# Patient Record
Sex: MALE | Race: WHITE | NOT HISPANIC OR LATINO | Employment: OTHER | URBAN - NONMETROPOLITAN AREA
[De-identification: names, ages, dates, MRNs, and addresses within clinical notes are randomized per-mention and may not be internally consistent; named-entity substitution may affect disease eponyms.]

---

## 2021-02-26 ENCOUNTER — HOSPITAL ENCOUNTER (EMERGENCY)
Facility: HOSPITAL | Age: 76
Discharge: HOME | End: 2021-02-26
Attending: EMERGENCY MEDICINE | Admitting: EMERGENCY MEDICINE

## 2021-02-26 VITALS
TEMPERATURE: 98.3 F | SYSTOLIC BLOOD PRESSURE: 150 MMHG | RESPIRATION RATE: 16 BRPM | BODY MASS INDEX: 18.55 KG/M2 | HEART RATE: 54 BPM | OXYGEN SATURATION: 99 % | DIASTOLIC BLOOD PRESSURE: 90 MMHG | WEIGHT: 140 LBS | HEIGHT: 73 IN

## 2021-02-26 DIAGNOSIS — E11.44 DIABETIC AMYOTROPHY ASSOCIATED WITH TYPE 2 DIABETES MELLITUS (HCC): Primary | ICD-10-CM

## 2021-02-26 PROCEDURE — 99282 EMERGENCY DEPT VISIT SF MDM: CPT

## 2021-02-26 RX ORDER — GABAPENTIN 300 MG/1
300 CAPSULE ORAL 3 TIMES DAILY
Qty: 30 CAPSULE | Refills: 0 | Status: SHIPPED | OUTPATIENT
Start: 2021-02-26 | End: 2021-03-31 | Stop reason: SDUPTHER

## 2021-03-15 ENCOUNTER — TELEPHONE (OUTPATIENT)
Dept: PRIMARY CARE CLINIC | Age: 76
End: 2021-03-15

## 2021-03-15 ENCOUNTER — NURSE TRIAGE (OUTPATIENT)
Dept: OTHER | Facility: CLINIC | Age: 76
End: 2021-03-15

## 2021-03-15 ENCOUNTER — TELEPHONE (OUTPATIENT)
Dept: ADMINISTRATIVE | Age: 76
End: 2021-03-15

## 2021-03-15 NOTE — TELEPHONE ENCOUNTER
Cristian Patton returned call for patient from correctional facility and request to make appt for patient. Informed unable to schedule appt today for patients,ED is available for evaluation if needed.  Unable to talk with patient personally

## 2021-03-15 NOTE — TELEPHONE ENCOUNTER
C/o swelling and numbness in in legs, from the waist down. Reason for Disposition   MODERATE swelling of both ankles (e.g., swelling extends up to the knees) AND new onset or worsening    Answer Assessment - Initial Assessment Questions  1. ONSET: \"When did the swelling start? \" (e.g., minutes, hours, days)      Swelling for many years, swelling has gotten worse in the last 3 weeks. 2. LOCATION: \"What part of the leg is swollen? \"  \"Are both legs swollen or just one leg? \"      Both legs   . 3. SEVERITY: \"How bad is the swelling? \" (e.g., localized; mild, moderate, severe)   - Localized - small area of swelling localized to one leg   - MILD pedal edema - swelling limited to foot and ankle, pitting edema < 1/4 inch (6 mm) deep, rest and elevation eliminate most or all swelling   - MODERATE edema - swelling of lower leg to knee, pitting edema > 1/4 inch (6 mm) deep, rest and elevation only partially reduce swelling   - SEVERE edema - swelling extends above knee, facial or hand swelling present       Below knees, moderate     4. REDNESS: \"Does the swelling look red or infected?\"          5. PAIN: \"Is the swelling painful to touch? \" If so, ask: \"How painful is it? \"   (Scale 1-10; mild, moderate or severe)      8-10/10 burning sensation     6. FEVER: \"Do you have a fever? \" If so, ask: \"What is it, how was it measured, and when did it start? \"       No     7. CAUSE: \"What do you think is causing the leg swelling? \"      Neuropathy      8. MEDICAL HISTORY: \"Do you have a history of heart failure, kidney disease, liver failure, or cancer? \"     No     9. RECURRENT SYMPTOM: \"Have you had leg swelling before? \" If so, ask: \"When was the last time? \" \"What happened that time? \"      Yes, chronic issue. 10. OTHER SYMPTOMS: \"Do you have any other symptoms? \" (e.g., chest pain, difficulty breathing)        Numbness from knees down. 11. PREGNANCY: \"Is there any chance you are pregnant? \" \"When was your last menstrual period? \"        No    Protocols used: LEG SWELLING AND EDEMA-ADULT-OH    Patient called Yesika Rader at [facility])  with red flag complaint. Brief description of triage: bilateral leg swelling, pain and numbness, worse over the past couple of days. Unable to sleep. Triage indicates for patient to be seen today. Care advice provided, patient verbalizes understanding; denies any other questions or concerns; instructed to call back for any new or worsening symptoms. Writer provided warm transfer to Leon at [facility] for appointment scheduling. Attention Provider: Thank you for allowing me to participate in the care of your patient. The patient was connected to triage in response to information provided to the ECC. Please do not respond through this encounter as the response is not directed to a shared pool.

## 2021-03-31 ENCOUNTER — HOSPITAL ENCOUNTER (EMERGENCY)
Facility: HOSPITAL | Age: 76
Discharge: HOME | End: 2021-03-31
Attending: FAMILY MEDICINE | Admitting: FAMILY MEDICINE

## 2021-03-31 VITALS
HEIGHT: 73 IN | BODY MASS INDEX: 18.82 KG/M2 | HEART RATE: 85 BPM | DIASTOLIC BLOOD PRESSURE: 83 MMHG | SYSTOLIC BLOOD PRESSURE: 140 MMHG | RESPIRATION RATE: 18 BRPM | OXYGEN SATURATION: 93 % | WEIGHT: 142 LBS | TEMPERATURE: 98.1 F

## 2021-03-31 DIAGNOSIS — E11.44 DIABETIC AMYOTROPHY ASSOCIATED WITH TYPE 2 DIABETES MELLITUS (HCC): ICD-10-CM

## 2021-03-31 DIAGNOSIS — G62.9 PERIPHERAL POLYNEUROPATHY: Primary | ICD-10-CM

## 2021-03-31 PROCEDURE — 99282 EMERGENCY DEPT VISIT SF MDM: CPT

## 2021-03-31 RX ORDER — GABAPENTIN 300 MG/1
300 CAPSULE ORAL 3 TIMES DAILY
Qty: 30 CAPSULE | Refills: 0 | Status: SHIPPED | OUTPATIENT
Start: 2021-03-31 | End: 2021-03-31

## 2021-03-31 RX ORDER — GABAPENTIN 300 MG/1
300 CAPSULE ORAL 3 TIMES DAILY
Qty: 30 CAPSULE | Refills: 0 | Status: SHIPPED | OUTPATIENT
Start: 2021-03-31 | End: 2021-05-04 | Stop reason: SDUPTHER

## 2021-04-19 ENCOUNTER — APPOINTMENT (OUTPATIENT)
Dept: CT IMAGING | Facility: HOSPITAL | Age: 76
End: 2021-04-19

## 2021-04-19 ENCOUNTER — HOSPITAL ENCOUNTER (EMERGENCY)
Facility: HOSPITAL | Age: 76
Discharge: HOME | End: 2021-04-19
Admitting: EMERGENCY MEDICINE

## 2021-04-19 VITALS
HEIGHT: 73 IN | TEMPERATURE: 98.2 F | SYSTOLIC BLOOD PRESSURE: 110 MMHG | WEIGHT: 145 LBS | OXYGEN SATURATION: 99 % | HEART RATE: 75 BPM | RESPIRATION RATE: 16 BRPM | DIASTOLIC BLOOD PRESSURE: 70 MMHG | BODY MASS INDEX: 19.22 KG/M2

## 2021-04-19 DIAGNOSIS — M54.50 CHRONIC MIDLINE LOW BACK PAIN WITHOUT SCIATICA: ICD-10-CM

## 2021-04-19 DIAGNOSIS — G62.9 NEUROPATHY: ICD-10-CM

## 2021-04-19 DIAGNOSIS — G89.29 CHRONIC MIDLINE LOW BACK PAIN WITHOUT SCIATICA: ICD-10-CM

## 2021-04-19 DIAGNOSIS — Z76.0 MEDICATION REFILL: Primary | ICD-10-CM

## 2021-04-19 PROCEDURE — 99283 EMERGENCY DEPT VISIT LOW MDM: CPT

## 2021-04-19 RX ORDER — PANTOPRAZOLE SODIUM 40 MG/1
40 TABLET, DELAYED RELEASE ORAL DAILY
COMMUNITY
End: 2021-04-19 | Stop reason: SDUPTHER

## 2021-04-19 RX ORDER — GABAPENTIN 300 MG/1
600 CAPSULE ORAL ONCE
Status: COMPLETED | OUTPATIENT
Start: 2021-04-19 | End: 2021-04-19

## 2021-04-19 RX ORDER — PANTOPRAZOLE SODIUM 40 MG/1
40 TABLET, DELAYED RELEASE ORAL ONCE
Status: COMPLETED | OUTPATIENT
Start: 2021-04-19 | End: 2021-04-19

## 2021-04-19 RX ORDER — PANTOPRAZOLE SODIUM 40 MG/1
40 TABLET, DELAYED RELEASE ORAL DAILY
Qty: 30 TABLET | Refills: 0 | Status: SHIPPED | OUTPATIENT
Start: 2021-04-19 | End: 2021-05-19

## 2021-04-19 RX ADMIN — GABAPENTIN 600 MG: 300 CAPSULE ORAL at 16:41

## 2021-04-19 RX ADMIN — PANTOPRAZOLE SODIUM 40 MG: 40 TABLET, DELAYED RELEASE ORAL at 16:41

## 2021-04-19 RX ADMIN — METOPROLOL TARTRATE 12.5 MG: 25 TABLET, FILM COATED ORAL at 16:41

## 2021-04-19 NOTE — DISCHARGE INSTRUCTIONS
It is important that you have a primary care provider while you are in the area. Please see the list below for available provider.   Please also see the information below for pain management.   We cannot refill medications such as Neurontin (gabapentin) in the ED. It is important that you establish care with a primary doctor or pain management to continue these medications while in the area and once you go back home.     Follow up with one of the [facility] physician groups below to setup primary care. If you have trouble making an appointment, please call the [facility] Nurse Line at [phone]    Dr. Mireille Gonzalez DO, Dr. Candelario Alexander DO, and Nora Ziegler, ALEIDA  [facility] Primary Care  [address] [place] [address], [address] [phone]    Dr. Dusty Cope MD  [facility] Internal Medicine - [place] [address], [address], [place] [address] [phone]    Dr. Troy Mast DO, Dr. Jabari Yao DO,  Ginger Schmid, ALEIDA, and ALEIDA Mackenzie  [facility] Family & Internal Medicine - [address] [address], [address], [place] [address] [phone]     Dr. Jennifer Patino MD, and ALEIDA Barnhart  [facility] Family Medicine [address], [place], [place] [address] [phone]    Dr. Erickson Currie MD and Dr. Teddy Love MD  [facility] Family [facility]  [address], [address]  [phone]    Dr. Cole Patricio MD  [facility] Family [facility]  [address] [place] [address], [facility], [place], [place], [address] [phone]    Dr. Ion Olson MD  [facility] Family Medicine - [place]  [address] [place], [place], [address] [phone]

## 2021-04-19 NOTE — ED PROVIDER NOTES
"Subjective   History of Present Illness    Patient is a 76-year-old male with PMH significant for chronic back pain, arthritis, diabetic neuropathy presenting to ED with need for medication refill.  Patient reports he was in a [facility] for approximately 34 years and was released 2 months ago at which time he began living in a penitentiary house.  Patient reports that prior to going into the [facility] he had been on Percocets and gabapentin's and while in the [facility] they had him on gabapentin for his neuropathy, metoprolol, as well as Protonix.  Patient reports that a nurse practitioner at the [facility] was able to give him a short supply when he was initially released however since that time he has not been able to establish care with a primary care provider.  Patient reports he is called a few however they have not had openings in their clinics.  Patient is on a waiting list to hopefully be seen next week.  Patient reports that he ran out of all of his medications, including his gabapentin, 6 days ago.  Patient reports since that time he has had worsening of his normal chronic neuropathy and chronic lower back pain.  Patient reports that normally his neuropathy will begin symmetrical at the level of the knees and extend downward, worsening as it is distal.  Patient describes that he has significant \"Curves and kinks\" in his spine which were worsened sleeping on only one mat in the [facility].  [facility] but reports he is still doing excessive walking, including walking 25 blocks the ER today.  Patient adamantly denies any changes in his pain and reports that had he been on his medications he feels it would have been controlled.  Patient states \"I will never be pain-free again.\"  Patient denies any new injuries, radiation of the pain into his thoracic or cervical spine.  Patient adamantly denies fevers, saddle anesthesia, incontinence of bowel or bladder, " urinary retention, strength changes to his bilateral lower extremities.  Patient denies any use of IV drugs.  Patient reports that he is hoping to get into a pain clinic and requests a referral if possible.  Patient did note that he only plans to be in this area for hopefully 1 more month and then will be returning home where he is also looking for a primary care provider.    Records reviewed show patient was seen in the ED on 2/26/2021 for diabetic ametropia associated with type 2 diabetes mellitus.  At that time patient was advised that he cannot routinely follow-up through the ED for medications and was offered a one-time refill of gabapentin 300 mg to be taken 3 times a day quantity #30.  Patient was also seen in the ED on 3/31/2021 at which time he was diagnosed with peripheral polyneuropathy and given a prescription for gabapentin 300 mg quantity #30.    Review of Systems   Constitutional: Negative.  Negative for fever.   HENT: Negative.    Eyes: Negative.    Respiratory: Negative.    Cardiovascular: Negative.    Gastrointestinal: Negative.    Genitourinary: Negative.         Denies urinary retention   Musculoskeletal: Positive for back pain (mid to lower, no change in chronic). Negative for arthralgias and gait problem (pain worsened after prolonged ambulation but no difficulty with gait).   Skin: Negative.  Negative for wound.   Neurological: Positive for numbness (bilateral LE to knees, at baseline for neuropathy).        Denies incontinence of bowel or bladder  Denies saddle anesthesia   Psychiatric/Behavioral: Negative.    All other systems reviewed and are negative.      Past Medical History:   Diagnosis Date   • Acid reflux    • Arthritis    • Hydrocele     SCHEDULED FOR SURGERY   • Intermittent palpitations    • Joint pain     MULTIPLE JOINTS       No Known Allergies    Past Surgical History:   Procedure Laterality Date   • HERNIA REPAIR      BILATERAL   • HYDROCELECTOMY Right 6/1/2016    Procedure:  HYDROCELECTOMY;  Surgeon: Jorge Browning MD;  Location: [facility];  Service:        History reviewed. No pertinent family history.    Social History     Socioeconomic History   • Marital status: Single     Spouse name: Not on file   • Number of children: Not on file   • Years of education: Not on file   • Highest education level: Not on file   Tobacco Use   • Smoking status: Former Smoker     Packs/day: 1.00     Years: 50.00     Pack years: 50.00   • Smokeless tobacco: Never Used   • Tobacco comment: quit 2004   Substance and Sexual Activity   • Alcohol use: No   • Drug use: No   • Sexual activity: Defer           Objective   Physical Exam  Vitals and nursing note reviewed.   Constitutional:       General: He is not in acute distress.     Appearance: Normal appearance. He is well-developed, well-groomed and normal weight. He is not diaphoretic.   HENT:      Head: Normocephalic and atraumatic.      Mouth/Throat:      Mouth: Mucous membranes are moist.      Pharynx: Oropharynx is clear.   Eyes:      Conjunctiva/sclera: Conjunctivae normal.      Pupils: Pupils are equal, round, and reactive to light.   Cardiovascular:      Rate and Rhythm: Normal rate and regular rhythm.      Comments: No calf tenderness bilaterally  Pulmonary:      Effort: Pulmonary effort is normal. No respiratory distress.      Breath sounds: Normal breath sounds.   Abdominal:      General: Bowel sounds are normal.      Palpations: Abdomen is soft.      Tenderness: There is no right CVA tenderness or left CVA tenderness.   Musculoskeletal:      Cervical back: Normal and normal range of motion.      Thoracic back: No spasms or tenderness. Scoliosis present.      Lumbar back: Spasms (paraspinal muscular tightness) and tenderness (diffusely to lower aspect) present. Negative right straight leg raise test and negative left straight leg raise test. Scoliosis present.      Comments: 5/5 symmetric strength to bilateral LE     Skin:     General: Skin  is warm and dry.      Findings: No signs of injury, rash or wound.   Neurological:      General: No focal deficit present.      Mental Status: He is alert and oriented to person, place, and time.      Sensory: Sensory deficit (Decreased sensation to light touch symmetrically bilateral lower extremities beginning at the level of the knee, patient reports at baseline for his neuropathy) present.      Motor: Motor function is intact.      Coordination: Coordination is intact.      Gait: Gait normal.   Psychiatric:         Attention and Perception: Attention normal.         Mood and Affect: Mood normal.         Speech: Speech normal.         Behavior: Behavior normal. Behavior is cooperative.         Procedures           ED Course  ED Course as of Apr 19 1701 Mon Apr 19, 2021   1615 [facility] report #[id number].  Patient has two previous prescriptions filled in the ED on 02/26/21 and 03/31/21 for quantity number 30 of 300 mg gabapentin.     [JS]      ED Course User Index  [JS] Ed Pickett PA-C                                           MDM  Number of Diagnoses or Management Options     Amount and/or Complexity of Data Reviewed  Tests in the medicine section of CPT®: ordered and reviewed  Decide to obtain previous medical records or to obtain history from someone other than the patient: yes  Review and summarize past medical records: yes  Discuss the patient with other providers: yes (Dr. Gupta (attending))    Patient Progress  Patient progress: improved    Patient is a 76-year-old male with PMH significant for chronic back pain, arthritis, diabetic neuropathy presenting to ED with need for medication refill after being out of his gabapentin, metoprolol, Protonix for 5 days. Patient denies any changes to his chronic state and reports being out of his medications for five days has worsened his normal symptoms. Patient has not yet been able to establish care with a primary care provider. Advised patient that he can be  given a dose of medications in ED, including gabapentin.  Discussed with patient that through the ER we cannot routinely refill scheduled medications.  Discussed that he has already received a refill of the gabapentin at this time we can no longer continue to provide refills of this medication.  Gave patient a list of local primary care providers as well as information on local pain management clinics.  Discussed significant importance of contacting and establishing care with them, despite how long he will be in [place], so that he can have these medicines return to normal.  Discussed with patient ability to refill his cardiac and GI medications however he cannot continue to the return to the ED for these.  Explained at length to patient limitations to medication refills through ED.  Patient is frustrated but understanding.  Patient appreciated further dose of medication in the ED reporting he feels slightly better.  Patient is able to ambulate without difficulty.  Patient with no further questions, concerns, or needs and will be stable for discharge.  This case was discussed with Dr. Jorje Gupta who was in agreement with refilling chronic cardiac and GI medications however inability to refill medications and no further recommendations at this time.    Final diagnoses:   Medication refill   Neuropathy   Chronic midline low back pain without sciatica       ED Disposition  ED Disposition     ED Disposition Condition Comment    Discharge Stable           Provider, No Known  [facility] [address]  [phone]    Schedule an appointment as soon as possible for a visit in 2 days      [facility] - [address] [address] [phone]  Schedule an appointment as soon as possible for a visit  As needed    [facility] Emergency Department  [address] [place] [address] [phone]    As needed         Medication List       Changed    metoprolol tartrate 25 MG tablet  Commonly known as: LOPRESSOR  Take 0.5 tablet by mouth 2 (Two) Times a Day  What changed:   · how much to take  · when to take this  · additional instructions           Where to Get Your Medications      These medications were sent to [facility] [place]  [address] [address]    Hours: 7AM-5PM Mon-Fri Phone: [phone]   · metoprolol tartrate 25 MG tablet  · pantoprazole 40 MG EC tablet          Ed Pickett PA-C  04/19/21 1819

## 2021-05-04 ENCOUNTER — HOSPITAL ENCOUNTER (EMERGENCY)
Facility: HOSPITAL | Age: 76
Discharge: HOME | End: 2021-05-04
Attending: FAMILY MEDICINE | Admitting: FAMILY MEDICINE

## 2021-05-04 VITALS
RESPIRATION RATE: 20 BRPM | DIASTOLIC BLOOD PRESSURE: 79 MMHG | OXYGEN SATURATION: 99 % | HEART RATE: 81 BPM | BODY MASS INDEX: 19.09 KG/M2 | SYSTOLIC BLOOD PRESSURE: 95 MMHG | TEMPERATURE: 98.4 F | WEIGHT: 144 LBS | HEIGHT: 73 IN

## 2021-05-04 DIAGNOSIS — G62.9 PERIPHERAL POLYNEUROPATHY: Primary | ICD-10-CM

## 2021-05-04 PROCEDURE — 99282 EMERGENCY DEPT VISIT SF MDM: CPT

## 2021-05-04 RX ORDER — GABAPENTIN 300 MG/1
300 CAPSULE ORAL 3 TIMES DAILY
Qty: 51 CAPSULE | Refills: 0 | Status: SHIPPED | OUTPATIENT
Start: 2021-05-04

## 2021-05-07 ENCOUNTER — APPOINTMENT (OUTPATIENT)
Dept: GENERAL RADIOLOGY | Facility: HOSPITAL | Age: 76
End: 2021-05-07

## 2021-05-07 ENCOUNTER — APPOINTMENT (OUTPATIENT)
Dept: CT IMAGING | Facility: HOSPITAL | Age: 76
End: 2021-05-07

## 2021-05-07 ENCOUNTER — HOSPITAL ENCOUNTER (INPATIENT)
Facility: HOSPITAL | Age: 76
LOS: 1 days | Discharge: HOME | End: 2021-05-08
Attending: SPECIALIST | Admitting: SPECIALIST

## 2021-05-07 DIAGNOSIS — R11.2 NAUSEA AND VOMITING, INTRACTABILITY OF VOMITING NOT SPECIFIED, UNSPECIFIED VOMITING TYPE: ICD-10-CM

## 2021-05-07 DIAGNOSIS — K56.609 SBO (SMALL BOWEL OBSTRUCTION) (HCC): Primary | ICD-10-CM

## 2021-05-07 LAB
ADV 40+41 DNA STL QL NAA+NON-PROBE: NOT DETECTED
ALBUMIN SERPL-MCNC: 3.7 G/DL (ref 3.5–5.2)
ALBUMIN/GLOB SERPL: 1.1 G/DL
ALP SERPL-CCNC: 84 U/L (ref 39–117)
ALT SERPL W P-5'-P-CCNC: 10 U/L (ref 1–41)
ANION GAP SERPL CALCULATED.3IONS-SCNC: 10 MMOL/L (ref 5–15)
AST SERPL-CCNC: 19 U/L (ref 1–40)
ASTRO TYP 1-8 RNA STL QL NAA+NON-PROBE: NOT DETECTED
BACTERIA UR QL AUTO: ABNORMAL /HPF
BASOPHILS # BLD AUTO: 0.01 10*3/MM3 (ref 0–0.2)
BASOPHILS NFR BLD AUTO: 0.1 % (ref 0–1.5)
BILIRUB SERPL-MCNC: 0.3 MG/DL (ref 0–1.2)
BILIRUB UR QL STRIP: NEGATIVE
BUN SERPL-MCNC: 30 MG/DL (ref 8–23)
BUN/CREAT SERPL: 31.9 (ref 7–25)
C CAYETANENSIS DNA STL QL NAA+NON-PROBE: NOT DETECTED
C COLI+JEJ+UPSA DNA STL QL NAA+NON-PROBE: NOT DETECTED
CALCIUM SPEC-SCNC: 8.4 MG/DL (ref 8.6–10.5)
CHLORIDE SERPL-SCNC: 108 MMOL/L (ref 98–107)
CLARITY UR: CLEAR
CO2 SERPL-SCNC: 19 MMOL/L (ref 22–29)
COLOR UR: ABNORMAL
CREAT SERPL-MCNC: 0.94 MG/DL (ref 0.76–1.27)
CRYPTOSP DNA STL QL NAA+NON-PROBE: NOT DETECTED
D-LACTATE SERPL-SCNC: 1.3 MMOL/L (ref 0.5–2)
DEPRECATED RDW RBC AUTO: 50.1 FL (ref 37–54)
E HISTOLYT DNA STL QL NAA+NON-PROBE: NOT DETECTED
EAEC PAA PLAS AGGR+AATA ST NAA+NON-PRB: NOT DETECTED
EC STX1+STX2 GENES STL QL NAA+NON-PROBE: NOT DETECTED
EOSINOPHIL # BLD AUTO: 0.04 10*3/MM3 (ref 0–0.4)
EOSINOPHIL NFR BLD AUTO: 0.5 % (ref 0.3–6.2)
EPEC EAE GENE STL QL NAA+NON-PROBE: NOT DETECTED
ERYTHROCYTE [DISTWIDTH] IN BLOOD BY AUTOMATED COUNT: 15.4 % (ref 12.3–15.4)
ETEC LTA+ST1A+ST1B TOX ST NAA+NON-PROBE: NOT DETECTED
G LAMBLIA DNA STL QL NAA+NON-PROBE: NOT DETECTED
GFR SERPL CREATININE-BSD FRML MDRD: 78 ML/MIN/1.73
GLOBULIN UR ELPH-MCNC: 3.3 GM/DL
GLUCOSE SERPL-MCNC: 110 MG/DL (ref 65–99)
GLUCOSE UR STRIP-MCNC: NEGATIVE MG/DL
HCT VFR BLD AUTO: 37.2 % (ref 37.5–51)
HGB BLD-MCNC: 12.1 G/DL (ref 13–17.7)
HGB UR QL STRIP.AUTO: NEGATIVE
HYALINE CASTS UR QL AUTO: ABNORMAL /LPF
IMM GRANULOCYTES # BLD AUTO: 0.04 10*3/MM3 (ref 0–0.05)
IMM GRANULOCYTES NFR BLD AUTO: 0.5 % (ref 0–0.5)
INR PPP: 1.29 (ref 0.91–1.09)
KETONES UR QL STRIP: NEGATIVE
LEUKOCYTE ESTERASE UR QL STRIP.AUTO: NEGATIVE
LYMPHOCYTES # BLD AUTO: 0.48 10*3/MM3 (ref 0.7–3.1)
LYMPHOCYTES NFR BLD AUTO: 6.3 % (ref 19.6–45.3)
MCH RBC QN AUTO: 28.8 PG (ref 26.6–33)
MCHC RBC AUTO-ENTMCNC: 32.5 G/DL (ref 31.5–35.7)
MCV RBC AUTO: 88.6 FL (ref 79–97)
MONOCYTES # BLD AUTO: 0.54 10*3/MM3 (ref 0.1–0.9)
MONOCYTES NFR BLD AUTO: 7.1 % (ref 5–12)
NEUTROPHILS NFR BLD AUTO: 6.53 10*3/MM3 (ref 1.7–7)
NEUTROPHILS NFR BLD AUTO: 85.5 % (ref 42.7–76)
NITRITE UR QL STRIP: NEGATIVE
NOROVIRUS GI+II RNA STL QL NAA+NON-PROBE: DETECTED
NRBC BLD AUTO-RTO: 0 /100 WBC (ref 0–0.2)
P SHIGELLOIDES DNA STL QL NAA+NON-PROBE: NOT DETECTED
PH UR STRIP.AUTO: <=5 [PH] (ref 5–8)
PLATELET # BLD AUTO: 180 10*3/MM3 (ref 140–450)
PMV BLD AUTO: 10.3 FL (ref 6–12)
POTASSIUM SERPL-SCNC: 3.6 MMOL/L (ref 3.5–5.2)
PROT SERPL-MCNC: 7 G/DL (ref 6–8.5)
PROT UR QL STRIP: ABNORMAL
PROTHROMBIN TIME: 15.1 SECONDS (ref 11.5–13.4)
RBC # BLD AUTO: 4.2 10*6/MM3 (ref 4.14–5.8)
RBC # UR: ABNORMAL /HPF
REF LAB TEST METHOD: ABNORMAL
RVA RNA STL QL NAA+NON-PROBE: NOT DETECTED
S ENT+BONG DNA STL QL NAA+NON-PROBE: NOT DETECTED
SAPO I+II+IV+V RNA STL QL NAA+NON-PROBE: NOT DETECTED
SARS-COV-2 RNA PNL SPEC NAA+PROBE: NOT DETECTED
SHIGELLA SP+EIEC IPAH ST NAA+NON-PROBE: NOT DETECTED
SODIUM SERPL-SCNC: 137 MMOL/L (ref 136–145)
SP GR UR STRIP: >1.03 (ref 1–1.03)
SQUAMOUS #/AREA URNS HPF: ABNORMAL /HPF
UROBILINOGEN UR QL STRIP: ABNORMAL
V CHOL+PARA+VUL DNA STL QL NAA+NON-PROBE: NOT DETECTED
V CHOLERAE DNA STL QL NAA+NON-PROBE: NOT DETECTED
WBC # BLD AUTO: 7.64 10*3/MM3 (ref 3.4–10.8)
WBC UR QL AUTO: ABNORMAL /HPF
Y ENTEROCOL DNA STL QL NAA+NON-PROBE: NOT DETECTED

## 2021-05-07 PROCEDURE — 25010000002 ONDANSETRON PER 1 MG: Performed by: NURSE PRACTITIONER

## 2021-05-07 PROCEDURE — 81001 URINALYSIS AUTO W/SCOPE: CPT | Performed by: NURSE PRACTITIONER

## 2021-05-07 PROCEDURE — 83605 ASSAY OF LACTIC ACID: CPT | Performed by: NURSE PRACTITIONER

## 2021-05-07 PROCEDURE — 85610 PROTHROMBIN TIME: CPT | Performed by: NURSE PRACTITIONER

## 2021-05-07 PROCEDURE — 25010000002 KETOROLAC TROMETHAMINE PER 15 MG: Performed by: SPECIALIST

## 2021-05-07 PROCEDURE — 80053 COMPREHEN METABOLIC PANEL: CPT | Performed by: NURSE PRACTITIONER

## 2021-05-07 PROCEDURE — 0097U HC BIOFIRE FILMARRAY GI PANEL: CPT | Performed by: NURSE PRACTITIONER

## 2021-05-07 PROCEDURE — 87635 SARS-COV-2 COVID-19 AMP PRB: CPT | Performed by: NURSE PRACTITIONER

## 2021-05-07 PROCEDURE — 99285 EMERGENCY DEPT VISIT HI MDM: CPT

## 2021-05-07 PROCEDURE — 0D9670Z DRAINAGE OF STOMACH WITH DRAINAGE DEVICE, VIA NATURAL OR ARTIFICIAL OPENING: ICD-10-PCS | Performed by: SPECIALIST

## 2021-05-07 PROCEDURE — 87040 BLOOD CULTURE FOR BACTERIA: CPT | Performed by: NURSE PRACTITIONER

## 2021-05-07 PROCEDURE — 71045 X-RAY EXAM CHEST 1 VIEW: CPT

## 2021-05-07 PROCEDURE — 25010000002 MORPHINE PER 10 MG: Performed by: SPECIALIST

## 2021-05-07 PROCEDURE — 74177 CT ABD & PELVIS W/CONTRAST: CPT

## 2021-05-07 PROCEDURE — 25010000002 IOPAMIDOL 61 % SOLUTION: Performed by: NURSE PRACTITIONER

## 2021-05-07 PROCEDURE — 85025 COMPLETE CBC W/AUTO DIFF WBC: CPT | Performed by: NURSE PRACTITIONER

## 2021-05-07 RX ORDER — ONDANSETRON 2 MG/ML
4 INJECTION INTRAMUSCULAR; INTRAVENOUS ONCE
Status: COMPLETED | OUTPATIENT
Start: 2021-05-07 | End: 2021-05-07

## 2021-05-07 RX ORDER — MORPHINE SULFATE 2 MG/ML
2 INJECTION, SOLUTION INTRAMUSCULAR; INTRAVENOUS
Status: DISCONTINUED | OUTPATIENT
Start: 2021-05-07 | End: 2021-05-08 | Stop reason: HOSPADM

## 2021-05-07 RX ORDER — METOPROLOL TARTRATE 5 MG/5ML
2.5 INJECTION INTRAVENOUS EVERY 6 HOURS
Status: DISCONTINUED | OUTPATIENT
Start: 2021-05-07 | End: 2021-05-08 | Stop reason: HOSPADM

## 2021-05-07 RX ORDER — ONDANSETRON 2 MG/ML
4 INJECTION INTRAMUSCULAR; INTRAVENOUS EVERY 6 HOURS PRN
Status: DISCONTINUED | OUTPATIENT
Start: 2021-05-07 | End: 2021-05-08 | Stop reason: HOSPADM

## 2021-05-07 RX ORDER — SODIUM CHLORIDE 0.9 % (FLUSH) 0.9 %
10 SYRINGE (ML) INJECTION EVERY 12 HOURS SCHEDULED
Status: DISCONTINUED | OUTPATIENT
Start: 2021-05-07 | End: 2021-05-08 | Stop reason: HOSPADM

## 2021-05-07 RX ORDER — LIDOCAINE HYDROCHLORIDE 40 MG/ML
4 SOLUTION TOPICAL ONCE
Status: COMPLETED | OUTPATIENT
Start: 2021-05-07 | End: 2021-05-07

## 2021-05-07 RX ORDER — FAMOTIDINE 10 MG/ML
20 INJECTION, SOLUTION INTRAVENOUS 2 TIMES DAILY
Status: DISCONTINUED | OUTPATIENT
Start: 2021-05-07 | End: 2021-05-08 | Stop reason: HOSPADM

## 2021-05-07 RX ORDER — SODIUM CHLORIDE 0.9 % (FLUSH) 0.9 %
10 SYRINGE (ML) INJECTION AS NEEDED
Status: DISCONTINUED | OUTPATIENT
Start: 2021-05-07 | End: 2021-05-08 | Stop reason: HOSPADM

## 2021-05-07 RX ORDER — KETOROLAC TROMETHAMINE 30 MG/ML
15 INJECTION, SOLUTION INTRAMUSCULAR; INTRAVENOUS EVERY 6 HOURS PRN
Status: DISCONTINUED | OUTPATIENT
Start: 2021-05-07 | End: 2021-05-08 | Stop reason: HOSPADM

## 2021-05-07 RX ORDER — DEXTROSE, SODIUM CHLORIDE, AND POTASSIUM CHLORIDE 5; .45; .15 G/100ML; G/100ML; G/100ML
125 INJECTION INTRAVENOUS CONTINUOUS
Status: DISCONTINUED | OUTPATIENT
Start: 2021-05-07 | End: 2021-05-08 | Stop reason: HOSPADM

## 2021-05-07 RX ADMIN — SODIUM CHLORIDE 1000 ML: 9 INJECTION, SOLUTION INTRAVENOUS at 12:28

## 2021-05-07 RX ADMIN — MORPHINE SULFATE 4 MG: 4 INJECTION, SOLUTION INTRAMUSCULAR; INTRAVENOUS at 17:14

## 2021-05-07 RX ADMIN — POTASSIUM CHLORIDE, DEXTROSE MONOHYDRATE AND SODIUM CHLORIDE 125 ML/HR: 150; 5; 450 INJECTION, SOLUTION INTRAVENOUS at 18:07

## 2021-05-07 RX ADMIN — IOPAMIDOL 100 ML: 612 INJECTION, SOLUTION INTRAVENOUS at 13:33

## 2021-05-07 RX ADMIN — ONDANSETRON HYDROCHLORIDE 4 MG: 2 SOLUTION INTRAMUSCULAR; INTRAVENOUS at 12:28

## 2021-05-07 RX ADMIN — METOPROLOL TARTRATE 2.5 MG: 5 INJECTION INTRAVENOUS at 17:14

## 2021-05-07 RX ADMIN — KETOROLAC TROMETHAMINE 15 MG: 30 INJECTION, SOLUTION INTRAMUSCULAR; INTRAVENOUS at 20:52

## 2021-05-07 RX ADMIN — FAMOTIDINE 20 MG: 10 INJECTION INTRAVENOUS at 20:52

## 2021-05-07 RX ADMIN — LIDOCAINE HYDROCHLORIDE 4 ML: 40 SOLUTION TOPICAL at 15:30

## 2021-05-07 RX ADMIN — SODIUM CHLORIDE 500 ML: 9 INJECTION, SOLUTION INTRAVENOUS at 20:52

## 2021-05-08 ENCOUNTER — APPOINTMENT (OUTPATIENT)
Dept: GENERAL RADIOLOGY | Facility: HOSPITAL | Age: 76
End: 2021-05-08

## 2021-05-08 VITALS
OXYGEN SATURATION: 95 % | DIASTOLIC BLOOD PRESSURE: 69 MMHG | HEART RATE: 74 BPM | SYSTOLIC BLOOD PRESSURE: 133 MMHG | BODY MASS INDEX: 18.66 KG/M2 | HEIGHT: 73 IN | WEIGHT: 140.8 LBS | RESPIRATION RATE: 16 BRPM | TEMPERATURE: 97.5 F

## 2021-05-08 LAB
ANION GAP SERPL CALCULATED.3IONS-SCNC: 7 MMOL/L (ref 5–15)
BASOPHILS # BLD AUTO: 0.01 10*3/MM3 (ref 0–0.2)
BASOPHILS NFR BLD AUTO: 0.1 % (ref 0–1.5)
BUN SERPL-MCNC: 17 MG/DL (ref 8–23)
BUN/CREAT SERPL: 23.6 (ref 7–25)
CALCIUM SPEC-SCNC: 7.9 MG/DL (ref 8.6–10.5)
CHLORIDE SERPL-SCNC: 113 MMOL/L (ref 98–107)
CO2 SERPL-SCNC: 19 MMOL/L (ref 22–29)
CREAT SERPL-MCNC: 0.72 MG/DL (ref 0.76–1.27)
DEPRECATED RDW RBC AUTO: 50 FL (ref 37–54)
EOSINOPHIL # BLD AUTO: 0.03 10*3/MM3 (ref 0–0.4)
EOSINOPHIL NFR BLD AUTO: 0.4 % (ref 0.3–6.2)
ERYTHROCYTE [DISTWIDTH] IN BLOOD BY AUTOMATED COUNT: 15.5 % (ref 12.3–15.4)
GFR SERPL CREATININE-BSD FRML MDRD: 106 ML/MIN/1.73
GLUCOSE SERPL-MCNC: 108 MG/DL (ref 65–99)
HCT VFR BLD AUTO: 33.2 % (ref 37.5–51)
HGB BLD-MCNC: 10.6 G/DL (ref 13–17.7)
IMM GRANULOCYTES # BLD AUTO: 0.02 10*3/MM3 (ref 0–0.05)
IMM GRANULOCYTES NFR BLD AUTO: 0.3 % (ref 0–0.5)
LYMPHOCYTES # BLD AUTO: 0.54 10*3/MM3 (ref 0.7–3.1)
LYMPHOCYTES NFR BLD AUTO: 7.7 % (ref 19.6–45.3)
MCH RBC QN AUTO: 28.2 PG (ref 26.6–33)
MCHC RBC AUTO-ENTMCNC: 31.9 G/DL (ref 31.5–35.7)
MCV RBC AUTO: 88.3 FL (ref 79–97)
MONOCYTES # BLD AUTO: 0.55 10*3/MM3 (ref 0.1–0.9)
MONOCYTES NFR BLD AUTO: 7.8 % (ref 5–12)
NEUTROPHILS NFR BLD AUTO: 5.88 10*3/MM3 (ref 1.7–7)
NEUTROPHILS NFR BLD AUTO: 83.7 % (ref 42.7–76)
NRBC BLD AUTO-RTO: 0 /100 WBC (ref 0–0.2)
PLATELET # BLD AUTO: 141 10*3/MM3 (ref 140–450)
PMV BLD AUTO: 10.5 FL (ref 6–12)
POTASSIUM SERPL-SCNC: 3.5 MMOL/L (ref 3.5–5.2)
RBC # BLD AUTO: 3.76 10*6/MM3 (ref 4.14–5.8)
SODIUM SERPL-SCNC: 139 MMOL/L (ref 136–145)
WBC # BLD AUTO: 7.03 10*3/MM3 (ref 3.4–10.8)

## 2021-05-08 PROCEDURE — 85025 COMPLETE CBC W/AUTO DIFF WBC: CPT | Performed by: SPECIALIST

## 2021-05-08 PROCEDURE — 74019 RADEX ABDOMEN 2 VIEWS: CPT

## 2021-05-08 PROCEDURE — 80048 BASIC METABOLIC PNL TOTAL CA: CPT | Performed by: SPECIALIST

## 2021-05-08 PROCEDURE — 25010000002 KETOROLAC TROMETHAMINE PER 15 MG: Performed by: SPECIALIST

## 2021-05-08 PROCEDURE — 25010000002 MORPHINE SULFATE (PF) 2 MG/ML SOLUTION: Performed by: SPECIALIST

## 2021-05-08 RX ORDER — ACETAMINOPHEN 325 MG/1
650 TABLET ORAL EVERY 6 HOURS PRN
Status: DISCONTINUED | OUTPATIENT
Start: 2021-05-08 | End: 2021-05-08 | Stop reason: HOSPADM

## 2021-05-08 RX ADMIN — FAMOTIDINE 20 MG: 10 INJECTION INTRAVENOUS at 08:51

## 2021-05-08 RX ADMIN — METOPROLOL TARTRATE 2.5 MG: 5 INJECTION INTRAVENOUS at 00:30

## 2021-05-08 RX ADMIN — ACETAMINOPHEN 650 MG: 325 TABLET, FILM COATED ORAL at 10:24

## 2021-05-08 RX ADMIN — MORPHINE SULFATE 2 MG: 2 INJECTION, SOLUTION INTRAMUSCULAR; INTRAVENOUS at 00:23

## 2021-05-08 RX ADMIN — KETOROLAC TROMETHAMINE 15 MG: 30 INJECTION, SOLUTION INTRAMUSCULAR; INTRAVENOUS at 03:56

## 2021-05-08 RX ADMIN — POTASSIUM CHLORIDE, DEXTROSE MONOHYDRATE AND SODIUM CHLORIDE 125 ML/HR: 150; 5; 450 INJECTION, SOLUTION INTRAVENOUS at 00:23

## 2021-05-08 RX ADMIN — MORPHINE SULFATE 2 MG: 2 INJECTION, SOLUTION INTRAMUSCULAR; INTRAVENOUS at 04:55

## 2021-05-08 NOTE — H&P
Cassidy Tyler MD  H&P    Patient Care Team:  Provider, No Known as PCP - General    Chief complaint nausea vomiting diarrhea for 2 days    Subjective     Edmond Zaman  is a 76 y.o. male severe diarrhea nausea vomiting last 2 days.  Began having worsening diarrhea and abdominal pain today so came to the ER for evaluation.  No one else around him is sick.  No blood in his stool or emesis.      Review of Systems   Pertinent items are noted in HPI, all other systems reviewed and negative    History  Past Medical History:   Diagnosis Date   • Acid reflux    • Arthritis    • Hydrocele     SCHEDULED FOR SURGERY   • Intermittent palpitations    • Joint pain     MULTIPLE JOINTS     Past Surgical History:   Procedure Laterality Date   • HERNIA REPAIR      BILATERAL   • HYDROCELECTOMY Right 6/1/2016    Procedure: HYDROCELECTOMY;  Surgeon: Jorge Browning MD;  Location: [facility];  Service:      History reviewed. No pertinent family history.  Social History     Tobacco Use   • Smoking status: Former Smoker     Packs/day: 1.00     Years: 50.00     Pack years: 50.00   • Smokeless tobacco: Never Used   • Tobacco comment: quit 2004   Vaping Use   • Vaping Use: Never used   Substance Use Topics   • Alcohol use: No   • Drug use: No     Medications Prior to Admission   Medication Sig Dispense Refill Last Dose   • gabapentin (NEURONTIN) 300 MG capsule Take 1 capsule by mouth 3 (Three) Times a Day. 51 capsule 0    • metoprolol tartrate (LOPRESSOR) 25 MG tablet Take 0.5 tablet by mouth 2 (Two) Times a Day 30 tablet 0    • pantoprazole (PROTONIX) 40 MG EC tablet Take 1 tablet by mouth Daily 30 tablet 0      Allergies:  Patient has no known allergies.    Objective     Vital Signs  Temp:  [97.6 °F (36.4 °C)-98.2 °F (36.8 °C)] 97.6 °F (36.4 °C)  Heart Rate:  [71-87] 75  Resp:  [18] 18  BP: ()/(59-75) 104/69    Physical Exam:      General Appearance:    Alert, cooperative, in no acute distress   Head:    Normocephalic,  without obvious abnormality, atraumatic   Eyes:            Lids and lashes normal, conjunctivae and sclerae normal, no   icterus, no pallor, corneas clear, PERRLA   Ears:    Ears appear intact with no abnormalities noted   Neck:   No adenopathy, supple, trachea midline   Back:     No kyphosis present, no scoliosis present, no skin lesions,      erythema or scars, no tenderness to percussion or                   palpation,   range of motion normal   Lungs:     Clear to auscultation,respirations regular, even and                  unlabored    Heart:    Regular rhythm and normal rate, normal S1 and S2, no            murmur, no gallop, no rub, no click   Chest Wall:    No abnormalities observed   Abdomen:    Soft mildly distended mildly tender   Rectal:     Deferred   Extremities:   Moves all extremities well, no edema, no cyanosis, no             redness   Pulses:   Pulses palpable and equal bilaterally   Skin:   No bleeding, bruising or rash   Lymph nodes:   No palpable adenopathy   Neurologic:   No focal deficits       Results Review:      Lab Results (last 72 hours)     Procedure Component Value Units Date/Time    Gastrointestinal Panel, PCR - Stool, Per Rectum [[id number]]  (Abnormal) Collected: 05/07/21 1520    Specimen: Stool from Per Rectum Updated: 05/07/21 1649     Campylobacter Not Detected     Plesiomonas shigelloides Not Detected     Salmonella Not Detected     Vibrio Not Detected     Vibrio cholerae Not Detected     Yersinia enterocolitica Not Detected     Enteroaggregative E. coli (EAEC) Not Detected     Enteropathogenic E. coli (EPEC) Not Detected     Enterotoxigenic E. coli (ETEC) lt/st Not Detected     Shiga-like toxin-producing E. coli (STEC) stx1/stx2 Not Detected     Shigella/Enteroinvasive E. coli (EIEC) Not Detected     Cryptosporidium Not Detected     Cyclospora cayetanensis Not Detected     Entamoeba histolytica Not Detected     Giardia lamblia Not Detected     Adenovirus F40/41 Not Detected      Astrovirus Not Detected     Norovirus GI/GII Detected     Rotavirus A Not Detected     Sapovirus (I, II, IV or V) Not Detected    Urinalysis With Culture If Indicated - Urine, Clean Catch [[id number]]  (Abnormal) Collected: 05/07/21 1333    Specimen: Urine, Clean Catch Updated: 05/07/21 1353     Color, UA Dark Yellow     Appearance, UA Clear     pH, UA <=5.0     Specific Gravity, UA >1.030     Glucose, UA Negative     Ketones, UA Negative     Bilirubin, UA Negative     Blood, UA Negative     Protein, UA 30 mg/dL (1+)     Leuk Esterase, UA Negative     Nitrite, UA Negative     Urobilinogen, UA 0.2 E.U./dL    Urinalysis, Microscopic Only - Urine, Clean Catch [[id number]]  (Abnormal) Collected: 05/07/21 1333    Specimen: Urine, Clean Catch Updated: 05/07/21 1353     RBC, UA 3-5 /HPF      WBC, UA 0-2 /HPF      Bacteria, UA None Seen /HPF      Squamous Epithelial Cells, UA 0-2 /HPF      Hyaline Casts, UA 0-2 /LPF      Methodology Automated Microscopy    Blood Culture - Blood, Arm, Left [[id number]] Collected: 05/07/21 1230    Specimen: Blood from Arm, Left Updated: 05/07/21 1337    Blood Culture - Blood, Arm, Right [[id number]] Collected: 05/07/21 1253    Specimen: Blood from Arm, Right Updated: 05/07/21 1337    COVID PRE-OP / PRE-PROCEDURE SCREENING ORDER (NO ISOLATION) - Swab, Nasal Cavity [[id number]]  (Normal) Collected: 05/07/21 1247    Specimen: Swab from Nasal Cavity Updated: 05/07/21 1334    Narrative:      The following orders were created for panel order COVID PRE-OP / PRE-PROCEDURE SCREENING ORDER (NO ISOLATION) - Swab, Nasal Cavity.  Procedure                               Abnormality         Status                     ---------                               -----------         ------                     COVID-19,[facility]...[[id number]]  Normal              Final result                 Please view results for these tests on the individual orders.    COVID-19,[facility] IN-HOUSE,Nasal Swab No Transport Media  3-4 HR TAT - Swab, Nasal Cavity [[id number]]  (Normal) Collected: 05/07/21 1247    Specimen: Swab from Nasal Cavity Updated: 05/07/21 1334     COVID19 Not Detected    Narrative:      Fact sheet for providers: https://www.fda.gov/media/528578/download     Fact sheet for patients: https://www.fda.gov/media/209479/download    Test performed by PCR.    Consider negative results in combination with clinical observations, patient history, and epidemiological information.  Fact sheet for providers: https://www.fda.gov/media/280618/download     Fact sheet for patients: https://www.[employer].gov/media/118564/download    Test performed by PCR.    Consider negative results in combination with clinical observations, patient history, and epidemiological information.    Comprehensive Metabolic Panel [[id number]]  (Abnormal) Collected: 05/07/21 1224    Specimen: Blood Updated: 05/07/21 1259     Glucose 110 mg/dL      BUN 30 mg/dL      Creatinine 0.94 mg/dL      Sodium 137 mmol/L      Potassium 3.6 mmol/L      Chloride 108 mmol/L      CO2 19.0 mmol/L      Calcium 8.4 mg/dL      Total Protein 7.0 g/dL      Albumin 3.70 g/dL      ALT (SGPT) 10 U/L      AST (SGOT) 19 U/L      Alkaline Phosphatase 84 U/L      Total Bilirubin 0.3 mg/dL      eGFR Non African Amer 78 mL/min/1.73      Globulin 3.3 gm/dL      A/G Ratio 1.1 g/dL      BUN/Creatinine Ratio 31.9     Anion Gap 10.0 mmol/L     Narrative:      GFR Normal >60  Chronic Kidney Disease <60  Kidney Failure <15      Lactic Acid, Plasma [[id number]]  (Normal) Collected: 05/07/21 1224    Specimen: Blood Updated: 05/07/21 1255     Lactate 1.3 mmol/L     Protime-INR [[id number]]  (Abnormal) Collected: 05/07/21 1224    Specimen: Blood Updated: 05/07/21 1250     Protime 15.1 Seconds      INR 1.29    CBC & Differential [[id number]]  (Abnormal) Collected: 05/07/21 1224    Specimen: Blood Updated: 05/07/21 1242    Narrative:      The following orders were created for panel order CBC &  Differential.  Procedure                               Abnormality         Status                     ---------                               -----------         ------                     CBC Auto Differential[[id number]]        Abnormal            Final result                 Please view results for these tests on the individual orders.    CBC Auto Differential [[id number]]  (Abnormal) Collected: 05/07/21 1224    Specimen: Blood Updated: 05/07/21 1242     WBC 7.64 10*3/mm3      RBC 4.20 10*6/mm3      Hemoglobin 12.1 g/dL      Hematocrit 37.2 %      MCV 88.6 fL      MCH 28.8 pg      MCHC 32.5 g/dL      RDW 15.4 %      RDW-SD 50.1 fl      MPV 10.3 fL      Platelets 180 10*3/mm3      Neutrophil % 85.5 %      Lymphocyte % 6.3 %      Monocyte % 7.1 %      Eosinophil % 0.5 %      Basophil % 0.1 %      Immature Grans % 0.5 %      Neutrophils, Absolute 6.53 10*3/mm3      Lymphocytes, Absolute 0.48 10*3/mm3      Monocytes, Absolute 0.54 10*3/mm3      Eosinophils, Absolute 0.04 10*3/mm3      Basophils, Absolute 0.01 10*3/mm3      Immature Grans, Absolute 0.04 10*3/mm3      nRBC 0.0 /100 WBC         Imaging Results (Last 72 Hours)     Procedure Component Value Units Date/Time    CT Abdomen Pelvis With Contrast [[id number]] Collected: 05/07/21 1354     Updated: 05/07/21 1408    Narrative:      CT ABDOMEN PELVIS W CONTRAST- 5/7/2021 1:28 PM CDT     HISTORY: abd pain, diarrhea/ vomiting       COMPARISON: None.      DOSE LENGTH PRODUCT: 218 mGy cm. Automated exposure control was also  utilized to decrease patient radiation dose.     TECHNIQUE: Following the intravenous administration of contrast, helical  CT tomographic images of the abdomen and pelvis were acquired.  Multiplanar reformatted images were provided for review.      FINDINGS:   LOWER CHEST: The lung bases and included mediastinal structures are  unremarkable.      LIVER: No suspicious liver lesion. The portal veins are patent..      BILIARY SYSTEM: The gallbladder is  decompressed. No intrahepatic or  extrahepatic ductal dilatation.      PANCREAS: No focal pancreatic lesion.      SPLEEN: Granulomatous calcification.      KIDNEYS AND ADRENALS: Adrenal glands are unremarkable.Kidneys are  unremarkable. The ureters are decompressed and normal in appearance.     RETROPERITONEUM: No mass, lymphadenopathy or hemorrhage.      GI TRACT: The stomach is nondistended. Numerous dilated, fluid-filled  mid and upper small bowel loops, dilated up to 3.9 cm in greatest  diameter. Numerous air-fluid levels identified throughout the small  bowel. The distal small bowel loops are actually decompressed, with  transition point somewhere in the mid abdomen. Discrete transition point  is difficult to visualize. There is also fluid identified in the colon  with scattered air-fluid levels throughout the colon.     OTHER: No mesenteric adenopathy or mass. Mild stranding identified in  the central mesentery and there is a trace amount of simple fluid  layering in the pelvis. No peritoneal abscess or intraperitoneal free  air. Abdominal vascular structures are patent. Spinal scoliotic  curvature with degenerative listhesis at several levels. Acquired  high-grade spinal stenosis and high-grade neuroforaminal narrowing the  lumbar spine. No acute bony abnormality.     PELVIS: No mass lesion, fluid collection or significant lymphadenopathy  is seen in the pelvis. The urinary bladder is normal in appearance.       Impression:      1. Numerous dilated, fluid-filled upper and mid small bowel loops with  transition point somewhere in the mid abdomen. The distal ileal loops  are actually decompressed. Primary concern is developing small bowel  obstruction although I do not see a discrete mass or hernia as a site  for mechanical obstruction. Layering fluid also identified within the  nondistended colon. Second differential consideration is enteritis as a  cause for the small bowel dilation and fluid-filled  loops.  2. No evidence of viscus perforation or peritoneal abscess.  This report was finalized on 05/07/2021 14:05 by Dr Jay Martinez, .    XR Chest 1 View [[id number]] Collected: 05/07/21 1250     Updated: 05/07/21 1254    Narrative:      EXAM: XR CHEST 1 VW- 5/7/2021 12:28 PM CDT     HISTORY: generalized abd pain       COMPARISON: None.     TECHNIQUE: Frontal radiograph of the chest     FINDINGS:   The lungs are clear. The cardiomediastinal silhouette and pulmonary  vascularity are within normal limits.      Moderate degenerative arthrosis is noted in the glenohumeral joints  bilaterally..          Impression:      1. No acute cardiopulmonary process.        2. 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        This report was finalized on 05/07/2021 12:51 by Dr. Kody Murrieta MD.          Assessment/Plan       SBO (small bowel obstruction) (CMS/[facility])      Patient admitted with diagnosis of partial small bowel obstruction however stools have returned positive for norovirus.  Will support with IV fluids and nausea medicine.  Leave NG tube for now but most likely will take it out in the morning and advance his diet      Cassidy Tyler MD  05/07/21  19:43 CDT

## 2021-05-08 NOTE — PLAN OF CARE
Problem: Adult Inpatient Plan of Care  Goal: Plan of Care Review  Outcome: Ongoing, Progressing   Goal Outcome Evaluation:  Pt had an uneventful night. He had a recorded NG output of 325mL of light greenish/yellow output over the past 12 hours. Pt has denied nausea and reports his abd is less distended then yesterday although he does c/o pain throughout the night. Pt BP stabilized after receiving 500mL bolus at start of shift.

## 2021-05-08 NOTE — DISCHARGE SUMMARY
Cassidy Tyler MD Discharge Summary    Date of Admission: 5/7/2021  Date of Discharge:  5/8/2021-anticipated    Discharge Diagnosis: Norovirus enteritis    Presenting Problem/History of Present Illness    History and Physical as outlined in the chart    Hospital Course  Patient was admitted and underwent the above operation.  Hospital course  was uneventful.  Patient will be discharged to home.  See medication reconciliation for medications at discharge.    Procedures Performed         Consults:   Consults     No orders found for last 30 day(s).          Condition on Discharge:  stable      Discharge Disposition  Home or Self Care    Discharge Medications     Discharge Medications      Continue These Medications      Instructions Start Date   gabapentin 300 MG capsule  Commonly known as: NEURONTIN   300 mg, Oral, 3 Times Daily      metoprolol tartrate 25 MG tablet  Commonly known as: LOPRESSOR   Take 0.5 tablet by mouth 2 (Two) Times a Day      pantoprazole 40 MG EC tablet  Commonly known as: PROTONIX   Take 1 tablet by mouth Daily             Discharge Diet:     Activity at Discharge:     Follow-up Appointments  No future appointments.      Test Results Pending at Discharge  Pending Labs     Order Current Status    Blood Culture - Blood, Arm, Left In process    Blood Culture - Blood, Arm, Right In process           Cassidy Tyler MD  05/08/21  08:28 CDT    Time: Time spent at discharge 30 minutes

## 2021-05-08 NOTE — PROGRESS NOTES
Cassidy Tyler MD Progress Note     LOS: 1 day   Patient Care Team:  Provider, No Known as PCP - General        Subjective     No more diarrhea through the night.    Objective     Vital Signs  Temp:  [97.6 °F (36.4 °C)-98.6 °F (37 °C)] 98.4 °F (36.9 °C)  Heart Rate:  [] 92  Resp:  [16-18] 16  BP: ()/() 127/62    Intake & Output (last 3 days)       05/05 0701 - 05/06 0700 05/06 0701 - 05/07 0700 05/07 0701 - 05/08 0700 05/08 0701 - 05/09 0700    I.V. (mL/kg)   1516 (23.7)     IV Piggyback   1000     Total Intake(mL/kg)   2516 (39.4)     Urine (mL/kg/hr)   400     Emesis/NG output   325     Stool   0     Total Output   725     Net   +1791             Urine Unmeasured Occurrence   1 x     Stool Unmeasured Occurrence   1 x           Physical Exam:     General Appearance:    Alert, cooperative, in no acute distress   Lungs:     respirations regular, even and unlabored    Heart:    Regular rhythm and normal rate, normal S1 and S2, no            murmur, no gallop, no rub   Chest Wall:    No abnormalities observed   Abdomen:      Soft nontender nondistended   Extremities: No edema,    Results Review:     I reviewed the patient's new clinical results.    Lab Results (last 72 hours)     Procedure Component Value Units Date/Time    Basic Metabolic Panel [[id number]]  (Abnormal) Collected: 05/08/21 0537    Specimen: Blood Updated: 05/08/21 0621     Glucose 108 mg/dL      BUN 17 mg/dL      Creatinine 0.72 mg/dL      Sodium 139 mmol/L      Potassium 3.5 mmol/L      Chloride 113 mmol/L      CO2 19.0 mmol/L      Calcium 7.9 mg/dL      eGFR Non African Amer 106 mL/min/1.73      BUN/Creatinine Ratio 23.6     Anion Gap 7.0 mmol/L     Narrative:      GFR Normal >60  Chronic Kidney Disease <60  Kidney Failure <15      CBC & Differential [[id number]]  (Abnormal) Collected: 05/08/21 0537    Specimen: Blood Updated: 05/08/21 0603    Narrative:      The following orders were created for panel order CBC &  Differential.  Procedure                               Abnormality         Status                     ---------                               -----------         ------                     CBC Auto Differential[[id number]]        Abnormal            Final result                 Please view results for these tests on the individual orders.    CBC Auto Differential [[id number]]  (Abnormal) Collected: 05/08/21 0537    Specimen: Blood Updated: 05/08/21 0603     WBC 7.03 10*3/mm3      RBC 3.76 10*6/mm3      Hemoglobin 10.6 g/dL      Hematocrit 33.2 %      MCV 88.3 fL      MCH 28.2 pg      MCHC 31.9 g/dL      RDW 15.5 %      RDW-SD 50.0 fl      MPV 10.5 fL      Platelets 141 10*3/mm3      Neutrophil % 83.7 %      Lymphocyte % 7.7 %      Monocyte % 7.8 %      Eosinophil % 0.4 %      Basophil % 0.1 %      Immature Grans % 0.3 %      Neutrophils, Absolute 5.88 10*3/mm3      Lymphocytes, Absolute 0.54 10*3/mm3      Monocytes, Absolute 0.55 10*3/mm3      Eosinophils, Absolute 0.03 10*3/mm3      Basophils, Absolute 0.01 10*3/mm3      Immature Grans, Absolute 0.02 10*3/mm3      nRBC 0.0 /100 WBC     Gastrointestinal Panel, PCR - Stool, Per Rectum [[id number]]  (Abnormal) Collected: 05/07/21 1520    Specimen: Stool from Per Rectum Updated: 05/07/21 1649     Campylobacter Not Detected     Plesiomonas shigelloides Not Detected     Salmonella Not Detected     Vibrio Not Detected     Vibrio cholerae Not Detected     Yersinia enterocolitica Not Detected     Enteroaggregative E. coli (EAEC) Not Detected     Enteropathogenic E. coli (EPEC) Not Detected     Enterotoxigenic E. coli (ETEC) lt/st Not Detected     Shiga-like toxin-producing E. coli (STEC) stx1/stx2 Not Detected     Shigella/Enteroinvasive E. coli (EIEC) Not Detected     Cryptosporidium Not Detected     Cyclospora cayetanensis Not Detected     Entamoeba histolytica Not Detected     Giardia lamblia Not Detected     Adenovirus F40/41 Not Detected     Astrovirus Not Detected      Norovirus GI/GII Detected     Rotavirus A Not Detected     Sapovirus (I, II, IV or V) Not Detected    Urinalysis With Culture If Indicated - Urine, Clean Catch [[id number]]  (Abnormal) Collected: 05/07/21 1333    Specimen: Urine, Clean Catch Updated: 05/07/21 1353     Color, UA Dark Yellow     Appearance, UA Clear     pH, UA <=5.0     Specific Gravity, UA >1.030     Glucose, UA Negative     Ketones, UA Negative     Bilirubin, UA Negative     Blood, UA Negative     Protein, UA 30 mg/dL (1+)     Leuk Esterase, UA Negative     Nitrite, UA Negative     Urobilinogen, UA 0.2 E.U./dL    Urinalysis, Microscopic Only - Urine, Clean Catch [[id number]]  (Abnormal) Collected: 05/07/21 1333    Specimen: Urine, Clean Catch Updated: 05/07/21 1353     RBC, UA 3-5 /HPF      WBC, UA 0-2 /HPF      Bacteria, UA None Seen /HPF      Squamous Epithelial Cells, UA 0-2 /HPF      Hyaline Casts, UA 0-2 /LPF      Methodology Automated Microscopy    Blood Culture - Blood, Arm, Left [[id number]] Collected: 05/07/21 1230    Specimen: Blood from Arm, Left Updated: 05/07/21 1337    Blood Culture - Blood, Arm, Right [[id number]] Collected: 05/07/21 1253    Specimen: Blood from Arm, Right Updated: 05/07/21 1337    COVID PRE-OP / PRE-PROCEDURE SCREENING ORDER (NO ISOLATION) - Swab, Nasal Cavity [[id number]]  (Normal) Collected: 05/07/21 1247    Specimen: Swab from Nasal Cavity Updated: 05/07/21 1334    Narrative:      The following orders were created for panel order COVID PRE-OP / PRE-PROCEDURE SCREENING ORDER (NO ISOLATION) - Swab, Nasal Cavity.  Procedure                               Abnormality         Status                     ---------                               -----------         ------                     COVID-19,[facility]...[[id number]]  Normal              Final result                 Please view results for these tests on the individual orders.    COVID-19,[facility]HOU[facility],Nasal Swab No Transport Media 3-4 HR TAT - Swab, Nasal  Cavity [[id number]]  (Normal) Collected: 05/07/21 1247    Specimen: Swab from Nasal Cavity Updated: 05/07/21 1334     COVID19 Not Detected    Narrative:      Fact sheet for providers: https://www.fda.gov/media/377717/download     Fact sheet for patients: https://www.fda.gov/media/732394/download    Test performed by PCR.    Consider negative results in combination with clinical observations, patient history, and epidemiological information.  Fact sheet for providers: https://www.fda.gov/media/160319/download     Fact sheet for patients: https://www.fda.gov/media/482355/download    Test performed by PCR.    Consider negative results in combination with clinical observations, patient history, and epidemiological information.    Comprehensive Metabolic Panel [[id number]]  (Abnormal) Collected: 05/07/21 1224    Specimen: Blood Updated: 05/07/21 1259     Glucose 110 mg/dL      BUN 30 mg/dL      Creatinine 0.94 mg/dL      Sodium 137 mmol/L      Potassium 3.6 mmol/L      Chloride 108 mmol/L      CO2 19.0 mmol/L      Calcium 8.4 mg/dL      Total Protein 7.0 g/dL      Albumin 3.70 g/dL      ALT (SGPT) 10 U/L      AST (SGOT) 19 U/L      Alkaline Phosphatase 84 U/L      Total Bilirubin 0.3 mg/dL      eGFR Non African Amer 78 mL/min/1.73      Globulin 3.3 gm/dL      A/G Ratio 1.1 g/dL      BUN/Creatinine Ratio 31.9     Anion Gap 10.0 mmol/L     Narrative:      GFR Normal >60  Chronic Kidney Disease <60  Kidney Failure <15      Lactic Acid, Plasma [[id number]]  (Normal) Collected: 05/07/21 1224    Specimen: Blood Updated: 05/07/21 1255     Lactate 1.3 mmol/L     Protime-INR [[id number]]  (Abnormal) Collected: 05/07/21 1224    Specimen: Blood Updated: 05/07/21 1250     Protime 15.1 Seconds      INR 1.29    CBC & Differential [[id number]]  (Abnormal) Collected: 05/07/21 1224    Specimen: Blood Updated: 05/07/21 1242    Narrative:      The following orders were created for panel order CBC & Differential.  Procedure                                Abnormality         Status                     ---------                               -----------         ------                     CBC Auto Differential[[id number]]        Abnormal            Final result                 Please view results for these tests on the individual orders.    CBC Auto Differential [[id number]]  (Abnormal) Collected: 05/07/21 1224    Specimen: Blood Updated: 05/07/21 1242     WBC 7.64 10*3/mm3      RBC 4.20 10*6/mm3      Hemoglobin 12.1 g/dL      Hematocrit 37.2 %      MCV 88.6 fL      MCH 28.8 pg      MCHC 32.5 g/dL      RDW 15.4 %      RDW-SD 50.1 fl      MPV 10.3 fL      Platelets 180 10*3/mm3      Neutrophil % 85.5 %      Lymphocyte % 6.3 %      Monocyte % 7.1 %      Eosinophil % 0.5 %      Basophil % 0.1 %      Immature Grans % 0.5 %      Neutrophils, Absolute 6.53 10*3/mm3      Lymphocytes, Absolute 0.48 10*3/mm3      Monocytes, Absolute 0.54 10*3/mm3      Eosinophils, Absolute 0.04 10*3/mm3      Basophils, Absolute 0.01 10*3/mm3      Immature Grans, Absolute 0.04 10*3/mm3      nRBC 0.0 /100 WBC         Imaging Results (Last 72 Hours)     Procedure Component Value Units Date/Time    XR Abdomen Flat & Upright [[id number]] Collected: 05/08/21 0730     Updated: 05/08/21 0737    Narrative:      XR ABDOMEN FLAT AND UPRIGHT- 5/8/2021 7:25 AM CDT     HISTORY: sbo; K56.609-Unspecified intestinal obstruction, unspecified as  to partial versus complete obstruction; R11.2-Nausea with vomiting,  unspecified     COMPARISON: CT scan dated 5/7/2021     FINDINGS:     Lung bases are clear. NG tube tip this at the gastroesophageal junction  with sidehole in the distal esophagus. Bowel gas pattern is  nonobstructive. Small bowel loops measure only 2.5 cm. Colon is  nondistended. No intraperitoneal free air. Spinal scoliotic curvature  with advanced lumbar spondylosis. No acute bony abnormality.       Impression:      1. Bowel gas pattern is nonobstructive, with small bowel loops measuring  up to  2.5 cm. Colon is nondistended.  2. NG tube tip is at the esophageal junction with sidehole in the distal  esophagus. Consider advancing if tube is to be maintained.  This report was finalized on 05/08/2021 07:34 by Dr Jay Martinez, .    CT Abdomen Pelvis With Contrast [[id number]] Collected: 05/07/21 1354     Updated: 05/07/21 1408    Narrative:      CT ABDOMEN PELVIS W CONTRAST- 5/7/2021 1:28 PM CDT     HISTORY: abd pain, diarrhea/ vomiting       COMPARISON: None.      DOSE LENGTH PRODUCT: 218 mGy cm. Automated exposure control was also  utilized to decrease patient radiation dose.     TECHNIQUE: Following the intravenous administration of contrast, helical  CT tomographic images of the abdomen and pelvis were acquired.  Multiplanar reformatted images were provided for review.      FINDINGS:   LOWER CHEST: The lung bases and included mediastinal structures are  unremarkable.      LIVER: No suspicious liver lesion. The portal veins are patent..      BILIARY SYSTEM: The gallbladder is decompressed. No intrahepatic or  extrahepatic ductal dilatation.      PANCREAS: No focal pancreatic lesion.      SPLEEN: Granulomatous calcification.      KIDNEYS AND ADRENALS: Adrenal glands are unremarkable.Kidneys are  unremarkable. The ureters are decompressed and normal in appearance.     RETROPERITONEUM: No mass, lymphadenopathy or hemorrhage.      GI TRACT: The stomach is nondistended. Numerous dilated, fluid-filled  mid and upper small bowel loops, dilated up to 3.9 cm in greatest  diameter. Numerous air-fluid levels identified throughout the small  bowel. The distal small bowel loops are actually decompressed, with  transition point somewhere in the mid abdomen. Discrete transition point  is difficult to visualize. There is also fluid identified in the colon  with scattered air-fluid levels throughout the colon.     OTHER: No mesenteric adenopathy or mass. Mild stranding identified in  the central mesentery and there is a  trace amount of simple fluid  layering in the pelvis. No peritoneal abscess or intraperitoneal free  air. Abdominal vascular structures are patent. Spinal scoliotic  curvature with degenerative listhesis at several levels. Acquired  high-grade spinal stenosis and high-grade neuroforaminal narrowing the  lumbar spine. No acute bony abnormality.     PELVIS: No mass lesion, fluid collection or significant lymphadenopathy  is seen in the pelvis. The urinary bladder is normal in appearance.       Impression:      1. Numerous dilated, fluid-filled upper and mid small bowel loops with  transition point somewhere in the mid abdomen. The distal ileal loops  are actually decompressed. Primary concern is developing small bowel  obstruction although I do not see a discrete mass or hernia as a site  for mechanical obstruction. Layering fluid also identified within the  nondistended colon. Second differential consideration is enteritis as a  cause for the small bowel dilation and fluid-filled loops.  2. No evidence of viscus perforation or peritoneal abscess.  This report was finalized on 05/07/2021 14:05 by Dr Jay Martinez, .    XR Chest 1 View [[id number]] Collected: 05/07/21 1250     Updated: 05/07/21 1254    Narrative:      EXAM: XR CHEST 1 VW- 5/7/2021 12:28 PM CDT     HISTORY: generalized abd pain       COMPARISON: None.     TECHNIQUE: Frontal radiograph of the chest     FINDINGS:   The lungs are clear. The cardiomediastinal silhouette and pulmonary  vascularity are within normal limits.      Moderate degenerative arthrosis is noted in the glenohumeral joints  bilaterally..          Impression:      1. No acute cardiopulmonary process.        2. 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        This report was finalized on 05/07/2021 12:51 by Dr. Kody Murrieta MD.              Assessment/Plan       SBO (small bowel obstruction) (CMS/[facility])      Will DC NG tube and begin regular diet.  If tolerated then he  would be able to go home.  I will call in some Zofran for as needed usage.  Instructed him to maintain hydration      Cassidy Tyler MD  05/08/21  08:25 CDT

## 2021-05-09 ENCOUNTER — READMISSION MANAGEMENT (OUTPATIENT)
Dept: CALL CENTER | Facility: HOSPITAL | Age: 76
End: 2021-05-09

## 2021-05-09 NOTE — OUTREACH NOTE
Prep Survey      Responses   [facility] patient discharged from?  [facility]   Is LACE score < 7 ?  No   Emergency Room discharge w/ pulse ox?  No   Eligibility  Readm Mgmt   Discharge diagnosis  Norovirus enteritis   Does the patient have one of the following disease processes/diagnoses(primary or secondary)?  Other   Does the patient have Home health ordered?  No   Is there a DME ordered?  No   Prep survey completed?  Yes          Nora Sneed RN

## 2021-05-10 NOTE — PAYOR COMM NOTE
"ADMIT INPT 5-7-21  UR  [phone]    NOT A LATE NOTIFICATION DUE TO WEEKEND    Marleen Zaman (76 y.o. Male)     Date of Birth Social Security Number Address Home Phone MRN    1945  [address] [facility] [phone] [id number]    Buddhism Marital Status          [facility] Single       Admission Date Admission Type Admitting Provider Attending Provider Department, Room/Bed    5/7/21 Emergency Cassidy Tyler MD  [facility] 3C, 381/1    Discharge Date Discharge Disposition Discharge Destination        5/8/2021 Home or Self Care              Attending Provider: (none)   Allergies: No Known Allergies    Isolation: None   Infection: Norovirus (05/07/21)   Code Status: Prior    Ht: 185.4 cm (73\")   Wt: 63.9 kg (140 lb 12.8 oz)    Admission Cmt: None   Principal Problem: None                Active Insurance as of 5/7/2021     Primary Coverage     Payor Plan Insurance Group Employer/Plan Group    [employer] [place] [employer] [place]      Payor Plan Address Payor Plan Phone Number Payor Plan Fax Number Effective Dates    [address] [address] [phone]  3/31/2021 - None Entered    [facility] [address]       Subscriber Name Subscriber Birth Date Member ID       MARLEEN ZAMAN 1945 [id number]                 Emergency Contacts      (Rel.) Home Phone Work Phone Mobile Phone    No,Contact [phone] -- --               History & Physical      Cassidy Tyler MD at 05/1945            Cassidy Tyler MD  H&P    Patient Care Team:  Provider, No Known as PCP - General    Chief complaint nausea vomiting diarrhea for 2 days    Subjective     Marleen Zaman  is a 76 y.o. male severe diarrhea nausea vomiting last 2 days.  Began having worsening diarrhea and abdominal pain today so came to the ER for evaluation.  No one else around him is sick.  No blood in his stool or emesis.      Review of Systems   Pertinent items are noted in HPI, all other systems " reviewed and negative    History  Past Medical History:   Diagnosis Date   • Acid reflux    • Arthritis    • Hydrocele     SCHEDULED FOR SURGERY   • Intermittent palpitations    • Joint pain     MULTIPLE JOINTS     Past Surgical History:   Procedure Laterality Date   • HERNIA REPAIR      BILATERAL   • HYDROCELECTOMY Right 6/1/2016    Procedure: HYDROCELECTOMY;  Surgeon: oJrge Browning MD;  Location: [facility];  Service:      History reviewed. No pertinent family history.  Social History     Tobacco Use   • Smoking status: Former Smoker     Packs/day: 1.00     Years: 50.00     Pack years: 50.00   • Smokeless tobacco: Never Used   • Tobacco comment: quit 2004   Vaping Use   • Vaping Use: Never used   Substance Use Topics   • Alcohol use: No   • Drug use: No     Medications Prior to Admission   Medication Sig Dispense Refill Last Dose   • gabapentin (NEURONTIN) 300 MG capsule Take 1 capsule by mouth 3 (Three) Times a Day. 51 capsule 0    • metoprolol tartrate (LOPRESSOR) 25 MG tablet Take 0.5 tablet by mouth 2 (Two) Times a Day 30 tablet 0    • pantoprazole (PROTONIX) 40 MG EC tablet Take 1 tablet by mouth Daily 30 tablet 0      Allergies:  Patient has no known allergies.    Objective     Vital Signs  Temp:  [97.6 °F (36.4 °C)-98.2 °F (36.8 °C)] 97.6 °F (36.4 °C)  Heart Rate:  [71-87] 75  Resp:  [18] 18  BP: ()/(59-75) 104/69    Physical Exam:      General Appearance:    Alert, cooperative, in no acute distress   Head:    Normocephalic, without obvious abnormality, atraumatic   Eyes:            Lids and lashes normal, conjunctivae and sclerae normal, no   icterus, no pallor, corneas clear, PERRLA   Ears:    Ears appear intact with no abnormalities noted   Neck:   No adenopathy, supple, trachea midline   Back:     No kyphosis present, no scoliosis present, no skin lesions,      erythema or scars, no tenderness to percussion or                   palpation,   range of motion normal   Lungs:     Clear to  auscultation,respirations regular, even and                  unlabored    Heart:    Regular rhythm and normal rate, normal S1 and S2, no            murmur, no gallop, no rub, no click   Chest Wall:    No abnormalities observed   Abdomen:    Soft mildly distended mildly tender   Rectal:     Deferred   Extremities:   Moves all extremities well, no edema, no cyanosis, no             redness   Pulses:   Pulses palpable and equal bilaterally   Skin:   No bleeding, bruising or rash   Lymph nodes:   No palpable adenopathy   Neurologic:   No focal deficits       Results Review:      Lab Results (last 72 hours)     Procedure Component Value Units Date/Time    Gastrointestinal Panel, PCR - Stool, Per Rectum [[id number]]  (Abnormal) Collected: 05/07/21 1520    Specimen: Stool from Per Rectum Updated: 05/07/21 1649     Campylobacter Not Detected     Plesiomonas shigelloides Not Detected     Salmonella Not Detected     Vibrio Not Detected     Vibrio cholerae Not Detected     Yersinia enterocolitica Not Detected     Enteroaggregative E. coli (EAEC) Not Detected     Enteropathogenic E. coli (EPEC) Not Detected     Enterotoxigenic E. coli (ETEC) lt/st Not Detected     Shiga-like toxin-producing E. coli (STEC) stx1/stx2 Not Detected     Shigella/Enteroinvasive E. coli (EIEC) Not Detected     Cryptosporidium Not Detected     Cyclospora cayetanensis Not Detected     Entamoeba histolytica Not Detected     Giardia lamblia Not Detected     Adenovirus F40/41 Not Detected     Astrovirus Not Detected     Norovirus GI/GII Detected     Rotavirus A Not Detected     Sapovirus (I, II, IV or V) Not Detected    Urinalysis With Culture If Indicated - Urine, Clean Catch [[id number]]  (Abnormal) Collected: 05/07/21 1333    Specimen: Urine, Clean Catch Updated: 05/07/21 1353     Color, UA Dark Yellow     Appearance, UA Clear     pH, UA <=5.0     Specific Gravity, UA >1.030     Glucose, UA Negative     Ketones, UA Negative     Bilirubin, UA Negative      Blood, UA Negative     Protein, UA 30 mg/dL (1+)     Leuk Esterase, UA Negative     Nitrite, UA Negative     Urobilinogen, UA 0.2 E.U./dL    Urinalysis, Microscopic Only - Urine, Clean Catch [[id number]]  (Abnormal) Collected: 05/07/21 1333    Specimen: Urine, Clean Catch Updated: 05/07/21 1353     RBC, UA 3-5 /HPF      WBC, UA 0-2 /HPF      Bacteria, UA None Seen /HPF      Squamous Epithelial Cells, UA 0-2 /HPF      Hyaline Casts, UA 0-2 /LPF      Methodology Automated Microscopy    Blood Culture - Blood, Arm, Left [[id number]] Collected: 05/07/21 1230    Specimen: Blood from Arm, Left Updated: 05/07/21 1337    Blood Culture - Blood, Arm, Right [[id number]] Collected: 05/07/21 1253    Specimen: Blood from Arm, Right Updated: 05/07/21 1337    COVID PRE-OP / PRE-PROCEDURE SCREENING ORDER (NO ISOLATION) - Swab, Nasal Cavity [[id number]]  (Normal) Collected: 05/07/21 1247    Specimen: Swab from Nasal Cavity Updated: 05/07/21 1334    Narrative:      The following orders were created for panel order COVID PRE-OP / PRE-PROCEDURE SCREENING ORDER (NO ISOLATION) - Swab, Nasal Cavity.  Procedure                               Abnormality         Status                     ---------                               -----------         ------                     COVID-19,[facility]...[[id number]]  Normal              Final result                 Please view results for these tests on the individual orders.    COVID-19,[facility] IN-HOUSE,Nasal Swab No Transport Media 3-4 HR TAT - Swab, Nasal Cavity [[id number]]  (Normal) Collected: 05/07/21 1247    Specimen: Swab from Nasal Cavity Updated: 05/07/21 1334     COVID19 Not Detected    Narrative:      Fact sheet for providers: https://www.fda.gov/media/305893/download     Fact sheet for patients: https://www.fda.gov/media/928683/download    Test performed by PCR.    Consider negative results in combination with clinical observations, patient history, and epidemiological information.  Fact  sheet for providers: https://www.fda.gov/media/866187/download     Fact sheet for patients: https://www.fda.gov/media/619519/download    Test performed by PCR.    Consider negative results in combination with clinical observations, patient history, and epidemiological information.    Comprehensive Metabolic Panel [[id number]]  (Abnormal) Collected: 05/07/21 1224    Specimen: Blood Updated: 05/07/21 1259     Glucose 110 mg/dL      BUN 30 mg/dL      Creatinine 0.94 mg/dL      Sodium 137 mmol/L      Potassium 3.6 mmol/L      Chloride 108 mmol/L      CO2 19.0 mmol/L      Calcium 8.4 mg/dL      Total Protein 7.0 g/dL      Albumin 3.70 g/dL      ALT (SGPT) 10 U/L      AST (SGOT) 19 U/L      Alkaline Phosphatase 84 U/L      Total Bilirubin 0.3 mg/dL      eGFR Non African Amer 78 mL/min/1.73      Globulin 3.3 gm/dL      A/G Ratio 1.1 g/dL      BUN/Creatinine Ratio 31.9     Anion Gap 10.0 mmol/L     Narrative:      GFR Normal >60  Chronic Kidney Disease <60  Kidney Failure <15      Lactic Acid, Plasma [[id number]]  (Normal) Collected: 05/07/21 1224    Specimen: Blood Updated: 05/07/21 1255     Lactate 1.3 mmol/L     Protime-INR [[id number]]  (Abnormal) Collected: 05/07/21 1224    Specimen: Blood Updated: 05/07/21 1250     Protime 15.1 Seconds      INR 1.29    CBC & Differential [[id number]]  (Abnormal) Collected: 05/07/21 1224    Specimen: Blood Updated: 05/07/21 1242    Narrative:      The following orders were created for panel order CBC & Differential.  Procedure                               Abnormality         Status                     ---------                               -----------         ------                     CBC Auto Differential[[id number]]        Abnormal            Final result                 Please view results for these tests on the individual orders.    CBC Auto Differential [[id number]]  (Abnormal) Collected: 05/07/21 1224    Specimen: Blood Updated: 05/07/21 1242     WBC 7.64 10*3/mm3      RBC 4.20  10*6/mm3      Hemoglobin 12.1 g/dL      Hematocrit 37.2 %      MCV 88.6 fL      MCH 28.8 pg      MCHC 32.5 g/dL      RDW 15.4 %      RDW-SD 50.1 fl      MPV 10.3 fL      Platelets 180 10*3/mm3      Neutrophil % 85.5 %      Lymphocyte % 6.3 %      Monocyte % 7.1 %      Eosinophil % 0.5 %      Basophil % 0.1 %      Immature Grans % 0.5 %      Neutrophils, Absolute 6.53 10*3/mm3      Lymphocytes, Absolute 0.48 10*3/mm3      Monocytes, Absolute 0.54 10*3/mm3      Eosinophils, Absolute 0.04 10*3/mm3      Basophils, Absolute 0.01 10*3/mm3      Immature Grans, Absolute 0.04 10*3/mm3      nRBC 0.0 /100 WBC         Imaging Results (Last 72 Hours)     Procedure Component Value Units Date/Time    CT Abdomen Pelvis With Contrast [[id number]] Collected: 05/07/21 1354     Updated: 05/07/21 1408    Narrative:      CT ABDOMEN PELVIS W CONTRAST- 5/7/2021 1:28 PM CDT     HISTORY: abd pain, diarrhea/ vomiting       COMPARISON: None.      DOSE LENGTH PRODUCT: 218 mGy cm. Automated exposure control was also  utilized to decrease patient radiation dose.     TECHNIQUE: Following the intravenous administration of contrast, helical  CT tomographic images of the abdomen and pelvis were acquired.  Multiplanar reformatted images were provided for review.      FINDINGS:   LOWER CHEST: The lung bases and included mediastinal structures are  unremarkable.      LIVER: No suspicious liver lesion. The portal veins are patent..      BILIARY SYSTEM: The gallbladder is decompressed. No intrahepatic or  extrahepatic ductal dilatation.      PANCREAS: No focal pancreatic lesion.      SPLEEN: Granulomatous calcification.      KIDNEYS AND ADRENALS: Adrenal glands are unremarkable.Kidneys are  unremarkable. The ureters are decompressed and normal in appearance.     RETROPERITONEUM: No mass, lymphadenopathy or hemorrhage.      GI TRACT: The stomach is nondistended. Numerous dilated, fluid-filled  mid and upper small bowel loops, dilated up to 3.9 cm in  greatest  diameter. Numerous air-fluid levels identified throughout the small  bowel. The distal small bowel loops are actually decompressed, with  transition point somewhere in the mid abdomen. Discrete transition point  is difficult to visualize. There is also fluid identified in the colon  with scattered air-fluid levels throughout the colon.     OTHER: No mesenteric adenopathy or mass. Mild stranding identified in  the central mesentery and there is a trace amount of simple fluid  layering in the pelvis. No peritoneal abscess or intraperitoneal free  air. Abdominal vascular structures are patent. Spinal scoliotic  curvature with degenerative listhesis at several levels. Acquired  high-grade spinal stenosis and high-grade neuroforaminal narrowing the  lumbar spine. No acute bony abnormality.     PELVIS: No mass lesion, fluid collection or significant lymphadenopathy  is seen in the pelvis. The urinary bladder is normal in appearance.       Impression:      1. Numerous dilated, fluid-filled upper and mid small bowel loops with  transition point somewhere in the mid abdomen. The distal ileal loops  are actually decompressed. Primary concern is developing small bowel  obstruction although I do not see a discrete mass or hernia as a site  for mechanical obstruction. Layering fluid also identified within the  nondistended colon. Second differential consideration is enteritis as a  cause for the small bowel dilation and fluid-filled loops.  2. No evidence of viscus perforation or peritoneal abscess.  This report was finalized on 05/07/2021 14:05 by Dr Jay Martinez, .    XR Chest 1 View [[id number]] Collected: 05/07/21 1250     Updated: 05/07/21 1254    Narrative:      EXAM: XR CHEST 1 VW- 5/7/2021 12:28 PM CDT     HISTORY: generalized abd pain       COMPARISON: None.     TECHNIQUE: Frontal radiograph of the chest     FINDINGS:   The lungs are clear. The cardiomediastinal silhouette and pulmonary  vascularity are within  normal limits.      Moderate degenerative arthrosis is noted in the glenohumeral joints  bilaterally..          Impression:      1. No acute cardiopulmonary process.        2. 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        This report was finalized on 05/07/2021 12:51 by Dr. Kody Murrieta MD.          Assessment/Plan       SBO (small bowel obstruction) (CMS/[facility])      Patient admitted with diagnosis of partial small bowel obstruction however stools have returned positive for norovirus.  Will support with IV fluids and nausea medicine.  Leave NG tube for now but most likely will take it out in the morning and advance his diet      Cassidy Tyler MD  05/07/21  19:43 CDT          Electronically signed by Cassidy Tyler MD at 05/07/21 1945          Emergency Department Notes      Amy Castillo APRN at 05/07/21 1220     Attestation signed by Javier Zhu MD at 05/07/21 1628          For this patient encounter, I reviewed the NP or PA documentation, treatment plan, and medical decision making. Javier Zhu MD 5/7/2021 16:28 CDT                  Subjective   Patient is a 76-year-old white male presents to emergency department with generalized abdominal pain, nausea, vomiting, and diarrhea for the past 2 days. He states he has been unable to get up out of the bed in the last 2 days due to weakness. He denies any fever or chills. No cough or congestion. No black or tarry stools      History provided by:  Patient   used: No        Review of Systems   Constitutional: Negative.    HENT: Negative.    Eyes: Negative.    Respiratory: Negative.    Cardiovascular: Negative.    Gastrointestinal: Negative.         Patient is a 76-year-old white male presents to emergency department with generalized abdominal pain, nausea, vomiting, and diarrhea for the past 2 days. He states he has been unable to get up out of the bed in the last 2 days due to weakness. He  "denies any fever or chills. No cough or congestion. No black or tarry stools     Endocrine: Negative.    Genitourinary: Negative.    Musculoskeletal: Negative.    Skin: Negative.    Allergic/Immunologic: Negative.    Neurological: Negative.    Hematological: Negative.    Psychiatric/Behavioral: Negative.    All other systems reviewed and are negative.      Past Medical History:   Diagnosis Date   • Acid reflux    • Arthritis    • Hydrocele     SCHEDULED FOR SURGERY   • Intermittent palpitations    • Joint pain     MULTIPLE JOINTS       No Known Allergies    Past Surgical History:   Procedure Laterality Date   • HERNIA REPAIR      BILATERAL   • HYDROCELECTOMY Right 6/1/2016    Procedure: HYDROCELECTOMY;  Surgeon: Jorge Browning MD;  Location: [facility];  Service:        History reviewed. No pertinent family history.    Social History     Socioeconomic History   • Marital status: Single     Spouse name: Not on file   • Number of children: Not on file   • Years of education: Not on file   • Highest education level: Not on file   Tobacco Use   • Smoking status: Former Smoker     Packs/day: 1.00     Years: 50.00     Pack years: 50.00   • Smokeless tobacco: Never Used   • Tobacco comment: quit 2004   Substance and Sexual Activity   • Alcohol use: No   • Drug use: No   • Sexual activity: Defer       Prior to Admission medications    Medication Sig Start Date End Date Taking? Authorizing Provider   gabapentin (NEURONTIN) 300 MG capsule Take 1 capsule by mouth 3 (Three) Times a Day. 5/4/21  Yes Ziggy King MD   metoprolol tartrate (LOPRESSOR) 25 MG tablet Take 0.5 tablet by mouth 2 (Two) Times a Day 4/19/21 5/19/21 Yes Ed Pickett PA-C   pantoprazole (PROTONIX) 40 MG EC tablet Take 1 tablet by mouth Daily 4/19/21 5/19/21 Yes Ed Pickett PA-C       BP 93/71   Pulse 76   Temp 98.2 °F (36.8 °C) (Oral)   Resp 18   Ht 185.4 cm (73\")   Wt 63.5 kg (140 lb)   SpO2 97%   BMI 18.47 kg/m² "     Objective   Physical Exam  Vitals and nursing note reviewed.   Constitutional:       Appearance: He is well-developed.      Comments: Nontoxic-appearing no acute distress   HENT:      Head: Normocephalic and atraumatic.   Eyes:      Conjunctiva/sclera: Conjunctivae normal.      Pupils: Pupils are equal, round, and reactive to light.   Cardiovascular:      Rate and Rhythm: Normal rate and regular rhythm.      Heart sounds: Normal heart sounds.   Pulmonary:      Effort: Pulmonary effort is normal.      Breath sounds: Normal breath sounds.   Abdominal:      General: Bowel sounds are normal.      Palpations: Abdomen is soft.      Comments: Abdomen is soft, nondistended. He has moderate tenderness generalized in all 4 quadrants. There is no guarding or rebound noted.   Musculoskeletal:         General: Normal range of motion.      Cervical back: Normal range of motion and neck supple.   Skin:     General: Skin is warm and dry.   Neurological:      Mental Status: He is alert and oriented to person, place, and time.      Deep Tendon Reflexes: Reflexes are normal and symmetric.   Psychiatric:         Behavior: Behavior normal.         Thought Content: Thought content normal.         Judgment: Judgment normal.         Procedures        Lab Results (last 24 hours)     Procedure Component Value Units Date/Time    CBC & Differential [[id number]]  (Abnormal) Collected: 05/07/21 1224    Specimen: Blood Updated: 05/07/21 1242    Narrative:      The following orders were created for panel order CBC & Differential.  Procedure                               Abnormality         Status                     ---------                               -----------         ------                     CBC Auto Differential[[id number]]        Abnormal            Final result                 Please view results for these tests on the individual orders.    Comprehensive Metabolic Panel [[id number]]  (Abnormal) Collected: 05/07/21 1224    Specimen:  Blood Updated: 05/07/21 1259     Glucose 110 mg/dL      BUN 30 mg/dL      Creatinine 0.94 mg/dL      Sodium 137 mmol/L      Potassium 3.6 mmol/L      Chloride 108 mmol/L      CO2 19.0 mmol/L      Calcium 8.4 mg/dL      Total Protein 7.0 g/dL      Albumin 3.70 g/dL      ALT (SGPT) 10 U/L      AST (SGOT) 19 U/L      Alkaline Phosphatase 84 U/L      Total Bilirubin 0.3 mg/dL      eGFR Non African Amer 78 mL/min/1.73      Globulin 3.3 gm/dL      A/G Ratio 1.1 g/dL      BUN/Creatinine Ratio 31.9     Anion Gap 10.0 mmol/L     Narrative:      GFR Normal >60  Chronic Kidney Disease <60  Kidney Failure <15      Protime-INR [[id number]]  (Abnormal) Collected: 05/07/21 1224    Specimen: Blood Updated: 05/07/21 1250     Protime 15.1 Seconds      INR 1.29    Lactic Acid, Plasma [[id number]]  (Normal) Collected: 05/07/21 1224    Specimen: Blood Updated: 05/07/21 1255     Lactate 1.3 mmol/L     CBC Auto Differential [[id number]]  (Abnormal) Collected: 05/07/21 1224    Specimen: Blood Updated: 05/07/21 1242     WBC 7.64 10*3/mm3      RBC 4.20 10*6/mm3      Hemoglobin 12.1 g/dL      Hematocrit 37.2 %      MCV 88.6 fL      MCH 28.8 pg      MCHC 32.5 g/dL      RDW 15.4 %      RDW-SD 50.1 fl      MPV 10.3 fL      Platelets 180 10*3/mm3      Neutrophil % 85.5 %      Lymphocyte % 6.3 %      Monocyte % 7.1 %      Eosinophil % 0.5 %      Basophil % 0.1 %      Immature Grans % 0.5 %      Neutrophils, Absolute 6.53 10*3/mm3      Lymphocytes, Absolute 0.48 10*3/mm3      Monocytes, Absolute 0.54 10*3/mm3      Eosinophils, Absolute 0.04 10*3/mm3      Basophils, Absolute 0.01 10*3/mm3      Immature Grans, Absolute 0.04 10*3/mm3      nRBC 0.0 /100 WBC     Blood Culture - Blood, Arm, Left [[id number]] Collected: 05/07/21 1230    Specimen: Blood from Arm, Left Updated: 05/07/21 1337    COVID PRE-OP / PRE-PROCEDURE SCREENING ORDER (NO ISOLATION) - Swab, Nasal Cavity [[id number]]  (Normal) Collected: 05/07/21 1247    Specimen: Swab from Nasal Cavity  Updated: 05/07/21 1334    Narrative:      The following orders were created for panel order COVID PRE-OP / PRE-PROCEDURE SCREENING ORDER (NO ISOLATION) - Swab, Nasal Cavity.  Procedure                               Abnormality         Status                     ---------                               -----------         ------                     COVID-19,[facility]...[[id number]]  Normal              Final result                 Please view results for these tests on the individual orders.    COVID-19,[facility] IN-HOUSE,Nasal Swab No Transport Media 3-4 HR TAT - Swab, Nasal Cavity [[id number]]  (Normal) Collected: 05/07/21 1247    Specimen: Swab from Nasal Cavity Updated: 05/07/21 1334     COVID19 Not Detected    Narrative:      Fact sheet for providers: https://www.fda.gov/media/890636/download     Fact sheet for patients: https://www.fda.gov/media/873842/download    Test performed by PCR.    Consider negative results in combination with clinical observations, patient history, and epidemiological information.  Fact sheet for providers: https://www.fda.gov/media/276503/download     Fact sheet for patients: https://www.fda.gov/media/963925/download    Test performed by PCR.    Consider negative results in combination with clinical observations, patient history, and epidemiological information.    Blood Culture - Blood, Arm, Right [[id number]] Collected: 05/07/21 1253    Specimen: Blood from Arm, Right Updated: 05/07/21 1337    Urinalysis With Culture If Indicated - Urine, Clean Catch [[id number]]  (Abnormal) Collected: 05/07/21 1333    Specimen: Urine, Clean Catch Updated: 05/07/21 1353     Color, UA Dark Yellow     Appearance, UA Clear     pH, UA <=5.0     Specific Gravity, UA >1.030     Glucose, UA Negative     Ketones, UA Negative     Bilirubin, UA Negative     Blood, UA Negative     Protein, UA 30 mg/dL (1+)     Leuk Esterase, UA Negative     Nitrite, UA Negative     Urobilinogen, UA 0.2 E.U./dL    Urinalysis,  Microscopic Only - Urine, Clean Catch [[id number]]  (Abnormal) Collected: 05/07/21 1333    Specimen: Urine, Clean Catch Updated: 05/07/21 1353     RBC, UA 3-5 /HPF      WBC, UA 0-2 /HPF      Bacteria, UA None Seen /HPF      Squamous Epithelial Cells, UA 0-2 /HPF      Hyaline Casts, UA 0-2 /LPF      Methodology Automated Microscopy    Gastrointestinal Panel, PCR - Stool, Per Rectum [[id number]] Collected: 05/07/21 1520    Specimen: Stool from Per Rectum Updated: 05/07/21 1524          CT Abdomen Pelvis With Contrast   Final Result   1. Numerous dilated, fluid-filled upper and mid small bowel loops with   transition point somewhere in the mid abdomen. The distal ileal loops   are actually decompressed. Primary concern is developing small bowel   obstruction although I do not see a discrete mass or hernia as a site   for mechanical obstruction. Layering fluid also identified within the   nondistended colon. Second differential consideration is enteritis as a   cause for the small bowel dilation and fluid-filled loops.   2. No evidence of viscus perforation or peritoneal abscess.   This report was finalized on 05/07/2021 14:05 by Dr Jay Martinez, .      XR Chest 1 View   Final Result   1. No acute cardiopulmonary process.           2. 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           This report was finalized on 05/07/2021 12:51 by Dr. Kody Murrieta MD.      XR Abdomen Flat & Upright    (Results Pending)       ED Course  ED Course as of May 07 1558   Fri May 07, 2021   1332 Pending ct scan of abd/pelvis at this time and urinalysis. Pt has had no vomiting since receiving zofran iv     [CW]   1410 Call placed to general surgery at this time for further.     [CW]   1413 Spoke with Dr. Tyler with general surgery., advised to place ng tube and he has accepted for admission. Advised pt of findings and he is in agreement with care plan. Advised that covid 19 test results are negative as well     [CW]       ED Course User Index  [CW] Amy Castillo, ALEIDA          [facility] [place]  Number of Diagnoses or Management Options  Nausea and vomiting, intractability of vomiting not specified, unspecified vomiting type: new and requires workup  SBO (small bowel obstruction) (CMS/HCC): new and requires workup     Amount and/or Complexity of Data Reviewed  Clinical lab tests: ordered and reviewed  Tests in the radiology section of CPT®: ordered and reviewed    Patient Progress  Patient progress: stable      Final diagnoses:   SBO (small bowel obstruction) (CMS/HCC)   Nausea and vomiting, intractability of vomiting not specified, unspecified vomiting type          Amy Castillo, ALEIDA  05/07/21 1558      Electronically signed by Javier Zhu MD at 05/07/21 1628       Vital Signs (last 7 days) before discharge     Date/Time   Temp   Temp src   Pulse   Resp   BP   Patient Position   SpO2    05/08/21 0830   97.5 (36.4)   --   74   16   133/69   Lying   95    05/08/21 0458   98.4 (36.9)   Oral   92   16   127/62   Lying   90    05/08/21 0010   98.1 (36.7)   --   102   --   152/84   --   91    BP: manual at 05/08/21 0010    05/07/21 2317   98.6 (37)   Oral   64   16   146/100   Lying   91    05/07/21 2216   --   --   --   --   96/70   Lying   --    05/07/21 2021   98 (36.7)   Oral   82   16   90/59   Lying   97    05/07/21 1605   97.6 (36.4)   Oral   75   18   104/69   Lying   100    05/07/21 1548   --   --   76   --   --   --   97    05/07/21 1500   --   --   76   18   93/71   --   96    05/07/21 1431   --   --   76   --   --   --   97    05/07/21 1430   --   --   81   18   93/66   --   94    05/07/21 1415   --   --   79   18   98/75   --   96    05/07/21 1411   --   --   78   --   --   --   97    05/07/21 1306   --   --   72   18   --   --   98    05/07/21 1305   --   --   71   --   --   --   96    05/07/21 1300   --   --   72   --   98/65   --   --    05/07/21 1249   --   --   80   --   --   --   99    05/07/21  1246   --   --   87   --   (!) 79/68   Standing   --    05/07/21 1245   --   --   82   --   (!) 78/69   Sitting   --    05/07/21 1244   --   --   77   --   (!) 81/66   Lying   --    05/07/21 1243   --   --   82   --   --   --   96    05/07/21 1221   --   --   --   --   (!) 84/67   Lying   --    05/07/21 1220   --   --   --   --   (!) 84/67   --   98    05/07/21 1219   --   --   --   --   --   --   94    05/07/21 1218   98.2 (36.8)   Oral   80   18   (!) 86/59   --   96              Oxygen Therapy (last 7 days) before discharge     Date/Time   SpO2   Device (Oxygen Therapy)   Flow (L/min)   Oxygen Concentration (%)   ETCO2 (mmHg)    05/08/21 0830   95   room air   --   --   --    05/08/21 0458   90   room air   --   --   --    05/08/21 0010   91   --   --   --   --    05/07/21 2317   91   room air   --   --   --    05/07/21 2052   --   room air   --   --   --    05/07/21 2021   97   room air   --   --   --    05/07/21 1605   100   room air   --   --   --    05/07/21 1600   --   room air   --   --   --    05/07/21 1548   97   --   --   --   --    05/07/21 1500   96   --   --   --   --    05/07/21 1431   97   --   --   --   --    05/07/21 1430   94   --   --   --   --    05/07/21 1415   96   --   --   --   --    05/07/21 1411   97   --   --   --   --    05/07/21 1306   98   --   --   --   --    05/07/21 1305   96   --   --   --   --    05/07/21 1249   99   --   --   --   --    05/07/21 1243   96   --   --   --   --    05/07/21 1220   98   --   --   --   --    05/07/21 1219   94   --   --   --   --    05/07/21 1218   96   room air   --   --   --            Intake & Output (last 7 days)       05/03 0701 - 05/04 0700 05/04 0701 - 05/05 0700 05/05 0701 - 05/06 0700 05/06 0701 - 05/07 0700 05/07 0701 - 05/08 0700 05/08 0701 - 05/09 0700 05/09 0701 - 05/10 0700 05/10 0701 - 05/11 0700    P.O.      240      I.V. (mL/kg)     1516 (23.7)       IV Piggyback     1000       Total Intake(mL/kg)     2516 (39.4) 240 (3.8)      Urine  (mL/kg/hr)     400       Emesis/NG output     325       Stool     0       Total Output     725       Net     +1791 +240                  Urine Unmeasured Occurrence     1 x 1 x      Stool Unmeasured Occurrence     1 x             Lines, Drains & Airways    Active LDAs     None         Inactive LDAs     Name:   Placement date:   Placement time:   Removal date:   Removal time:   Site:   Days:    [REMOVED] Peripheral IV 05/07/21 1228 Posterior;Right Forearm   05/07/21    1228    05/08/21    1003    Forearm   less than 1    [REMOVED] NG/OG Tube Nasogastric 18 Fr Right nostril   05/07/21    1533    05/08/21    1001    Right nostril   less than 1                  Facility-Administered Medications as of 5/8/2021   Medication Dose Route Frequency Provider Last Rate Last Admin   • [COMPLETED] iopamidol (ISOVUE-300) 61 % injection 100 mL  100 mL Intravenous Once in imaging Amy Castillo APRN   100 mL at 05/07/21 1333   • [COMPLETED] lidocaine (LTA KIT) 4 % laryngotracheal solution 4 mL  4 mL Mouth/Throat Once Amy Castillo APRN   4 mL at 05/07/21 1530   • [COMPLETED] ondansetron (ZOFRAN) injection 4 mg  4 mg Intravenous Once Amy Castillo APRN   4 mg at 05/07/21 1228   • [COMPLETED] sodium chloride 0.9 % bolus 1,000 mL  1,000 mL Intravenous Once Amy Castillo APRN   Stopped at 05/07/21 1336   • [COMPLETED] sodium chloride 0.9 % bolus 500 mL  500 mL Intravenous Once Cassidy Tyler  mL/hr at 05/07/21 2052 500 mL at 05/07/21 2052     Orders (last 7 days)      Start     Ordered    05/09/21 1221  Auto Discontinue GI Panel in 48 Hours if not Collected  ONCE GI PANEL,   Status:  Canceled      05/07/21 1220    05/08/21 1012  acetaminophen (TYLENOL) tablet 650 mg  Every 6 Hours PRN,   Status:  Discontinued      05/08/21 1012    05/08/21 0829  Discharge patient  Once      05/08/21 0828    05/08/21 0825  Discontinue Nasogastric Tube  Once      05/08/21 0825    05/08/21 0825  Diet Regular  Diet  Effective Now,   Status:  Canceled      05/08/21 0825    05/08/21 0700  XR Abdomen Flat & Upright  1 Time Imaging      05/07/21 1538 05/08/21 0600  CBC & Differential  Daily,   Status:  Canceled      05/07/21 1538 05/08/21 0600  Basic Metabolic Panel  Daily,   Status:  Canceled      05/07/21 1538 05/08/21 0600  CBC Auto Differential  PROCEDURE ONCE      05/08/21 0002    05/08/21 0052  Opioid Administration - Capnography (EtCO2) Monitoring  Per Order Details,   Status:  Canceled      05/08/21 0051    05/08/21 0052  Opioid Administration - Document EtCO2 Value With Each Set of Vitals & Any Change in Patient Status  Per Order Details,   Status:  Canceled      05/08/21 0051    05/08/21 0052  Opioid Administration - Notify Provider Capnography (EtCO2)  Until Discontinued,   Status:  Canceled      05/08/21 0051    05/08/21 0052  Opioid Administration - Continuous Pulse Oximetry (SpO2) Monitoring  Per Order Details,   Status:  Canceled      05/08/21 0051    05/08/21 0052  Opioid Administration - Document SpO2 Value With Each Set of Vitals & Any Change in Patient Status  Per Order Details,   Status:  Canceled      05/08/21 0051    05/08/21 0052  Opioid Administration - Notify Provider Pulse Oximetry (SpO2)  Until Discontinued,   Status:  Canceled      05/08/21 0051    05/07/21 2130  sodium chloride 0.9 % bolus 500 mL  Once      05/07/21 2039 05/07/21 2100  sodium chloride 0.9 % flush 10 mL  Every 12 Hours Scheduled,   Status:  Discontinued      05/07/21 1538 05/07/21 2100  famotidine (PEPCID) injection 20 mg  2 Times Daily,   Status:  Discontinued      05/07/21 1538 05/07/21 2039  ketorolac (TORADOL) injection 15 mg  Every 6 Hours PRN,   Status:  Discontinued      05/07/21 2039 05/07/21 1815  dextrose 5 % and sodium chloride 0.45 % with KCl 20 mEq/L infusion  Continuous,   Status:  Discontinued      05/07/21 1725    05/07/21 1600  Vital Signs  Every 4 Hours,   Status:  Canceled      05/07/21 1538     05/07/21 1600  metoprolol tartrate (LOPRESSOR) injection 2.5 mg  Every 6 Hours,   Status:  Discontinued      05/07/21 1538    05/07/21 1537  Morphine sulfate (PF) injection 2 mg  Every 3 Hours PRN,   Status:  Discontinued      05/07/21 1538    05/07/21 1537  morphine injection 4 mg  Every 3 Hours PRN,   Status:  Discontinued      05/07/21 1538    05/07/21 1537  ondansetron (ZOFRAN) injection 4 mg  Every 6 Hours PRN,   Status:  Discontinued      05/07/21 1538    05/07/21 1537  NPO Diet  Diet Effective Now,   Status:  Canceled      05/07/21 1538    05/07/21 1536  Code Status and Medical Interventions:  Continuous,   Status:  Canceled      05/07/21 1538    05/07/21 1536  Intake & Output  Every Shift,   Status:  Canceled      05/07/21 1538    05/07/21 1536  Weigh Patient  Once      05/07/21 1538    05/07/21 1536  Oxygen Therapy- Nasal Cannula; Titrate for SPO2: 90% - 95%  Continuous,   Status:  Canceled      05/07/21 1538    05/07/21 1536  Insert Peripheral IV  Once,   Status:  Canceled      05/07/21 1538    05/07/21 1536  Saline Lock & Maintain IV Access  Continuous      05/07/21 1538    05/07/21 1536  Place Sequential Compression Device  Once      05/07/21 1538    05/07/21 1536  Maintain Sequential Compression Device  Continuous,   Status:  Canceled      05/07/21 1538    05/07/21 1535  sodium chloride 0.9 % flush 10 mL  As Needed,   Status:  Discontinued      05/07/21 1538    05/07/21 1435  Inpatient Admission  Once      05/07/21 1435    05/07/21 1422  lidocaine (LTA KIT) 4 % laryngotracheal solution 4 mL  Once      05/07/21 1420    05/07/21 1422  Initiate Observation Status  Once      05/07/21 1421    05/07/21 1421  Undress and Gown  Once      05/07/21 1420    05/07/21 1420  Nasogastric tube insertion  Once      05/07/21 1420    05/07/21 1352  Urinalysis, Microscopic Only - Urine, Clean Catch  Once      05/07/21 1351    05/07/21 1335  iopamidol (ISOVUE-300) 61 % injection 100 mL  Once in Imaging      05/07/21 1333     05/07/21 1236  Blood Culture - Blood, Arm, Right  Once      05/07/21 1236    05/07/21 1236  Blood Culture - Blood, Arm, Left  Once     Comments: 30 minutes after first collection, or from a different site      05/07/21 1236    05/07/21 1226  COVID PRE-OP / PRE-PROCEDURE SCREENING ORDER (NO ISOLATION) - Swab, Nasal Cavity  Once      05/07/21 1226    05/07/21 1226  COVID-19,[facility] IN-HOUSE,Nasal Swab No Transport Media 3-4 HR TAT - Swab, Nasal Cavity  PROCEDURE ONCE      05/07/21 1226    05/07/21 1222  sodium chloride 0.9 % bolus 1,000 mL  Once      05/07/21 1220    05/07/21 1222  ondansetron (ZOFRAN) injection 4 mg  Once      05/07/21 1220    05/07/21 1221  Monitor Blood Pressure  Per Hospital Policy      05/07/21 1220    05/07/21 1221  Cardiac Monitoring  Once      05/07/21 1220    05/07/21 1221  Pulse Oximetry, Continuous  Continuous,   Status:  Canceled      05/07/21 1220    05/07/21 1221  Orthostatic Vitals  Once      05/07/21 1220    05/07/21 1221  Gastrointestinal Panel, PCR - Stool, Per Rectum  Once      05/07/21 1220    05/07/21 1220  CBC & Differential  Once      05/07/21 1220    05/07/21 1220  Comprehensive Metabolic Panel  Once      05/07/21 1220    05/07/21 1220  Protime-INR  Once      05/07/21 1220    05/07/21 1220  Urinalysis With Culture If Indicated - Urine, Clean Catch  Once      05/07/21 1220    05/07/21 1220  Lactic Acid, Plasma  Once      05/07/21 1220    05/07/21 1220  XR Chest 1 View  1 Time Imaging      05/07/21 1220    05/07/21 1220  CT Abdomen Pelvis With Contrast  1 Time Imaging      05/07/21 1220    05/07/21 1220  CBC Auto Differential  PROCEDURE ONCE      05/07/21 1221    --  SCANNED - TELEMETRY        05/07/21 0000                Operative/Procedure Notes (all)    No notes of this type exist for this encounter.            Physician Progress Notes (last 72 hours) (Notes from 05/07/21 0806 through 05/10/21 0806)      Cassiyd Tyler MD at 05/08/21 0825          Cassidy Tyler MD Progress  Note     LOS: 1 day   Patient Care Team:  Provider, No Known as PCP - General        Subjective     No more diarrhea through the night.    Objective     Vital Signs  Temp:  [97.6 °F (36.4 °C)-98.6 °F (37 °C)] 98.4 °F (36.9 °C)  Heart Rate:  [] 92  Resp:  [16-18] 16  BP: ()/() 127/62    Intake & Output (last 3 days)       05/05 0701 - 05/06 0700 05/06 0701 - 05/07 0700 05/07 0701 - 05/08 0700 05/08 0701 - 05/09 0700    I.V. (mL/kg)   1516 (23.7)     IV Piggyback   1000     Total Intake(mL/kg)   2516 (39.4)     Urine (mL/kg/hr)   400     Emesis/NG output   325     Stool   0     Total Output   725     Net   +1791             Urine Unmeasured Occurrence   1 x     Stool Unmeasured Occurrence   1 x           Physical Exam:     General Appearance:    Alert, cooperative, in no acute distress   Lungs:     respirations regular, even and unlabored    Heart:    Regular rhythm and normal rate, normal S1 and S2, no            murmur, no gallop, no rub   Chest Wall:    No abnormalities observed   Abdomen:      Soft nontender nondistended   Extremities: No edema,    Results Review:     I reviewed the patient's new clinical results.    Lab Results (last 72 hours)     Procedure Component Value Units Date/Time    Basic Metabolic Panel [[id number]]  (Abnormal) Collected: 05/08/21 0537    Specimen: Blood Updated: 05/08/21 0621     Glucose 108 mg/dL      BUN 17 mg/dL      Creatinine 0.72 mg/dL      Sodium 139 mmol/L      Potassium 3.5 mmol/L      Chloride 113 mmol/L      CO2 19.0 mmol/L      Calcium 7.9 mg/dL      eGFR Non African Amer 106 mL/min/1.73      BUN/Creatinine Ratio 23.6     Anion Gap 7.0 mmol/L     Narrative:      GFR Normal >60  Chronic Kidney Disease <60  Kidney Failure <15      CBC & Differential [[id number]]  (Abnormal) Collected: 05/08/21 0537    Specimen: Blood Updated: 05/08/21 0603    Narrative:      The following orders were created for panel order CBC & Differential.  Procedure                                Abnormality         Status                     ---------                               -----------         ------                     CBC Auto Differential[[id number]]        Abnormal            Final result                 Please view results for these tests on the individual orders.    CBC Auto Differential [[id number]]  (Abnormal) Collected: 05/08/21 0537    Specimen: Blood Updated: 05/08/21 0603     WBC 7.03 10*3/mm3      RBC 3.76 10*6/mm3      Hemoglobin 10.6 g/dL      Hematocrit 33.2 %      MCV 88.3 fL      MCH 28.2 pg      MCHC 31.9 g/dL      RDW 15.5 %      RDW-SD 50.0 fl      MPV 10.5 fL      Platelets 141 10*3/mm3      Neutrophil % 83.7 %      Lymphocyte % 7.7 %      Monocyte % 7.8 %      Eosinophil % 0.4 %      Basophil % 0.1 %      Immature Grans % 0.3 %      Neutrophils, Absolute 5.88 10*3/mm3      Lymphocytes, Absolute 0.54 10*3/mm3      Monocytes, Absolute 0.55 10*3/mm3      Eosinophils, Absolute 0.03 10*3/mm3      Basophils, Absolute 0.01 10*3/mm3      Immature Grans, Absolute 0.02 10*3/mm3      nRBC 0.0 /100 WBC     Gastrointestinal Panel, PCR - Stool, Per Rectum [[id number]]  (Abnormal) Collected: 05/07/21 1520    Specimen: Stool from Per Rectum Updated: 05/07/21 1649     Campylobacter Not Detected     Plesiomonas shigelloides Not Detected     Salmonella Not Detected     Vibrio Not Detected     Vibrio cholerae Not Detected     Yersinia enterocolitica Not Detected     Enteroaggregative E. coli (EAEC) Not Detected     Enteropathogenic E. coli (EPEC) Not Detected     Enterotoxigenic E. coli (ETEC) lt/st Not Detected     Shiga-like toxin-producing E. coli (STEC) stx1/stx2 Not Detected     Shigella/Enteroinvasive E. coli (EIEC) Not Detected     Cryptosporidium Not Detected     Cyclospora cayetanensis Not Detected     Entamoeba histolytica Not Detected     Giardia lamblia Not Detected     Adenovirus F40/41 Not Detected     Astrovirus Not Detected     Norovirus GI/GII Detected     Rotavirus A Not  Detected     Sapovirus (I, II, IV or V) Not Detected    Urinalysis With Culture If Indicated - Urine, Clean Catch [[id number]]  (Abnormal) Collected: 05/07/21 1333    Specimen: Urine, Clean Catch Updated: 05/07/21 1353     Color, UA Dark Yellow     Appearance, UA Clear     pH, UA <=5.0     Specific Gravity, UA >1.030     Glucose, UA Negative     Ketones, UA Negative     Bilirubin, UA Negative     Blood, UA Negative     Protein, UA 30 mg/dL (1+)     Leuk Esterase, UA Negative     Nitrite, UA Negative     Urobilinogen, UA 0.2 E.U./dL    Urinalysis, Microscopic Only - Urine, Clean Catch [[id number]]  (Abnormal) Collected: 05/07/21 1333    Specimen: Urine, Clean Catch Updated: 05/07/21 1353     RBC, UA 3-5 /HPF      WBC, UA 0-2 /HPF      Bacteria, UA None Seen /HPF      Squamous Epithelial Cells, UA 0-2 /HPF      Hyaline Casts, UA 0-2 /LPF      Methodology Automated Microscopy    Blood Culture - Blood, Arm, Left [[id number]] Collected: 05/07/21 1230    Specimen: Blood from Arm, Left Updated: 05/07/21 1337    Blood Culture - Blood, Arm, Right [[id number]] Collected: 05/07/21 1253    Specimen: Blood from Arm, Right Updated: 05/07/21 1337    COVID PRE-OP / PRE-PROCEDURE SCREENING ORDER (NO ISOLATION) - Swab, Nasal Cavity [[id number]]  (Normal) Collected: 05/07/21 1247    Specimen: Swab from Nasal Cavity Updated: 05/07/21 1334    Narrative:      The following orders were created for panel order COVID PRE-OP / PRE-PROCEDURE SCREENING ORDER (NO ISOLATION) - Swab, Nasal Cavity.  Procedure                               Abnormality         Status                     ---------                               -----------         ------                     COVID-19,[facility]...[[id number]]  Normal              Final result                 Please view results for these tests on the individual orders.    COVID-19,[facility] IN-HOUSE,Nasal Swab No Transport Media 3-4 HR TAT - Swab, Nasal Cavity [[id number]]  (Normal) Collected: 05/07/21  1247    Specimen: Swab from Nasal Cavity Updated: 05/07/21 1334     COVID19 Not Detected    Narrative:      Fact sheet for providers: https://www.fda.gov/media/866379/download     Fact sheet for patients: https://www.fda.gov/media/618265/download    Test performed by PCR.    Consider negative results in combination with clinical observations, patient history, and epidemiological information.  Fact sheet for providers: https://www.fda.gov/media/694367/download     Fact sheet for patients: https://www.fda.gov/media/013283/download    Test performed by PCR.    Consider negative results in combination with clinical observations, patient history, and epidemiological information.    Comprehensive Metabolic Panel [[id number]]  (Abnormal) Collected: 05/07/21 1224    Specimen: Blood Updated: 05/07/21 1259     Glucose 110 mg/dL      BUN 30 mg/dL      Creatinine 0.94 mg/dL      Sodium 137 mmol/L      Potassium 3.6 mmol/L      Chloride 108 mmol/L      CO2 19.0 mmol/L      Calcium 8.4 mg/dL      Total Protein 7.0 g/dL      Albumin 3.70 g/dL      ALT (SGPT) 10 U/L      AST (SGOT) 19 U/L      Alkaline Phosphatase 84 U/L      Total Bilirubin 0.3 mg/dL      eGFR Non African Amer 78 mL/min/1.73      Globulin 3.3 gm/dL      A/G Ratio 1.1 g/dL      BUN/Creatinine Ratio 31.9     Anion Gap 10.0 mmol/L     Narrative:      GFR Normal >60  Chronic Kidney Disease <60  Kidney Failure <15      Lactic Acid, Plasma [[id number]]  (Normal) Collected: 05/07/21 1224    Specimen: Blood Updated: 05/07/21 1255     Lactate 1.3 mmol/L     Protime-INR [[id number]]  (Abnormal) Collected: 05/07/21 1224    Specimen: Blood Updated: 05/07/21 1250     Protime 15.1 Seconds      INR 1.29    CBC & Differential [[id number]]  (Abnormal) Collected: 05/07/21 1224    Specimen: Blood Updated: 05/07/21 1242    Narrative:      The following orders were created for panel order CBC & Differential.  Procedure                               Abnormality         Status                      ---------                               -----------         ------                     CBC Auto Differential[[id number]]        Abnormal            Final result                 Please view results for these tests on the individual orders.    CBC Auto Differential [[id number]]  (Abnormal) Collected: 05/07/21 1224    Specimen: Blood Updated: 05/07/21 1242     WBC 7.64 10*3/mm3      RBC 4.20 10*6/mm3      Hemoglobin 12.1 g/dL      Hematocrit 37.2 %      MCV 88.6 fL      MCH 28.8 pg      MCHC 32.5 g/dL      RDW 15.4 %      RDW-SD 50.1 fl      MPV 10.3 fL      Platelets 180 10*3/mm3      Neutrophil % 85.5 %      Lymphocyte % 6.3 %      Monocyte % 7.1 %      Eosinophil % 0.5 %      Basophil % 0.1 %      Immature Grans % 0.5 %      Neutrophils, Absolute 6.53 10*3/mm3      Lymphocytes, Absolute 0.48 10*3/mm3      Monocytes, Absolute 0.54 10*3/mm3      Eosinophils, Absolute 0.04 10*3/mm3      Basophils, Absolute 0.01 10*3/mm3      Immature Grans, Absolute 0.04 10*3/mm3      nRBC 0.0 /100 WBC         Imaging Results (Last 72 Hours)     Procedure Component Value Units Date/Time    XR Abdomen Flat & Upright [[id number]] Collected: 05/08/21 0730     Updated: 05/08/21 0737    Narrative:      XR ABDOMEN FLAT AND UPRIGHT- 5/8/2021 7:25 AM CDT     HISTORY: sbo; K56.609-Unspecified intestinal obstruction, unspecified as  to partial versus complete obstruction; R11.2-Nausea with vomiting,  unspecified     COMPARISON: CT scan dated 5/7/2021     FINDINGS:     Lung bases are clear. NG tube tip this at the gastroesophageal junction  with sidehole in the distal esophagus. Bowel gas pattern is  nonobstructive. Small bowel loops measure only 2.5 cm. Colon is  nondistended. No intraperitoneal free air. Spinal scoliotic curvature  with advanced lumbar spondylosis. No acute bony abnormality.       Impression:      1. Bowel gas pattern is nonobstructive, with small bowel loops measuring  up to 2.5 cm. Colon is nondistended.  2. NG tube tip is  at the esophageal junction with sidehole in the distal  esophagus. Consider advancing if tube is to be maintained.  This report was finalized on 05/08/2021 07:34 by Dr Jay Martinez, .    CT Abdomen Pelvis With Contrast [[id number]] Collected: 05/07/21 1354     Updated: 05/07/21 1408    Narrative:      CT ABDOMEN PELVIS W CONTRAST- 5/7/2021 1:28 PM CDT     HISTORY: abd pain, diarrhea/ vomiting       COMPARISON: None.      DOSE LENGTH PRODUCT: 218 mGy cm. Automated exposure control was also  utilized to decrease patient radiation dose.     TECHNIQUE: Following the intravenous administration of contrast, helical  CT tomographic images of the abdomen and pelvis were acquired.  Multiplanar reformatted images were provided for review.      FINDINGS:   LOWER CHEST: The lung bases and included mediastinal structures are  unremarkable.      LIVER: No suspicious liver lesion. The portal veins are patent..      BILIARY SYSTEM: The gallbladder is decompressed. No intrahepatic or  extrahepatic ductal dilatation.      PANCREAS: No focal pancreatic lesion.      SPLEEN: Granulomatous calcification.      KIDNEYS AND ADRENALS: Adrenal glands are unremarkable.Kidneys are  unremarkable. The ureters are decompressed and normal in appearance.     RETROPERITONEUM: No mass, lymphadenopathy or hemorrhage.      GI TRACT: The stomach is nondistended. Numerous dilated, fluid-filled  mid and upper small bowel loops, dilated up to 3.9 cm in greatest  diameter. Numerous air-fluid levels identified throughout the small  bowel. The distal small bowel loops are actually decompressed, with  transition point somewhere in the mid abdomen. Discrete transition point  is difficult to visualize. There is also fluid identified in the colon  with scattered air-fluid levels throughout the colon.     OTHER: No mesenteric adenopathy or mass. Mild stranding identified in  the central mesentery and there is a trace amount of simple fluid  layering in the pelvis.  No peritoneal abscess or intraperitoneal free  air. Abdominal vascular structures are patent. Spinal scoliotic  curvature with degenerative listhesis at several levels. Acquired  high-grade spinal stenosis and high-grade neuroforaminal narrowing the  lumbar spine. No acute bony abnormality.     PELVIS: No mass lesion, fluid collection or significant lymphadenopathy  is seen in the pelvis. The urinary bladder is normal in appearance.       Impression:      1. Numerous dilated, fluid-filled upper and mid small bowel loops with  transition point somewhere in the mid abdomen. The distal ileal loops  are actually decompressed. Primary concern is developing small bowel  obstruction although I do not see a discrete mass or hernia as a site  for mechanical obstruction. Layering fluid also identified within the  nondistended colon. Second differential consideration is enteritis as a  cause for the small bowel dilation and fluid-filled loops.  2. No evidence of viscus perforation or peritoneal abscess.  This report was finalized on 05/07/2021 14:05 by Dr Jay Martinez, .    XR Chest 1 View [[id number]] Collected: 05/07/21 1250     Updated: 05/07/21 1254    Narrative:      EXAM: XR CHEST 1 VW- 5/7/2021 12:28 PM CDT     HISTORY: generalized abd pain       COMPARISON: None.     TECHNIQUE: Frontal radiograph of the chest     FINDINGS:   The lungs are clear. The cardiomediastinal silhouette and pulmonary  vascularity are within normal limits.      Moderate degenerative arthrosis is noted in the glenohumeral joints  bilaterally..          Impression:      1. No acute cardiopulmonary process.        2. 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        This report was finalized on 05/07/2021 12:51 by Dr. Kody Murrieta MD.              Assessment/Plan       SBO (small bowel obstruction) (CMS/HCC)      Will DC NG tube and begin regular diet.  If tolerated then he would be able to go home.  I will call in some Zofran  for as needed usage.  Instructed him to maintain hydration      Cassidy Tyler MD  05/08/21  08:25 CDT        Electronically signed by Cassidy Tyler MD at 05/08/21 0826       Consult Notes (last 72 hours) (Notes from 05/07/21 0807 through 05/10/21 0807)    No notes of this type exist for this encounter.

## 2021-05-12 ENCOUNTER — READMISSION MANAGEMENT (OUTPATIENT)
Dept: CALL CENTER | Facility: HOSPITAL | Age: 76
End: 2021-05-12

## 2021-05-12 LAB
BACTERIA SPEC AEROBE CULT: NORMAL
BACTERIA SPEC AEROBE CULT: NORMAL

## 2021-05-12 NOTE — OUTREACH NOTE
Medical Week 1 Survey      Responses   [facility], [place], operated by [facility] patient discharged from?  [place]   Does the patient have one of the following disease processes/diagnoses(primary or secondary)?  Other   Week 1 attempt successful?  No   Unsuccessful attempts  Attempt 1   Call end time  1146   Week 1 call completed?  Yes   Is the patient interested in additional calls from an ambulatory ?  NOTE:  applies to high risk patients requiring additional follow-up.  No   Revoked  No further contact(revokes)-requires comment   Graduated/Revoked comments  Reformatory          Bailey Dobbs RN

## 2021-05-24 ENCOUNTER — HOSPITAL ENCOUNTER (EMERGENCY)
Facility: HOSPITAL | Age: 76
Discharge: HOME | End: 2021-05-24
Admitting: EMERGENCY MEDICINE

## 2021-05-24 VITALS
WEIGHT: 140 LBS | OXYGEN SATURATION: 96 % | DIASTOLIC BLOOD PRESSURE: 76 MMHG | RESPIRATION RATE: 18 BRPM | BODY MASS INDEX: 18.55 KG/M2 | TEMPERATURE: 97.7 F | SYSTOLIC BLOOD PRESSURE: 123 MMHG | HEIGHT: 73 IN | HEART RATE: 91 BPM

## 2021-05-24 DIAGNOSIS — Z76.0 MEDICATION REFILL: Primary | ICD-10-CM

## 2021-05-24 PROCEDURE — 99282 EMERGENCY DEPT VISIT SF MDM: CPT

## 2021-05-24 RX ORDER — PANTOPRAZOLE SODIUM 40 MG/1
40 TABLET, DELAYED RELEASE ORAL DAILY
Qty: 30 TABLET | Refills: 0 | Status: SHIPPED | OUTPATIENT
Start: 2021-05-24

## 2021-05-24 NOTE — ED PROVIDER NOTES
Subjective   Patient is a 76-year-old male with history significant for arthritis, palpitations, joint pain, hypertension, reflux.  He presents for medication refill.  He is currently out of his metoprolol and pantoprazole.  He has no other complaints for this provider.  He states he is at his usual baseline.          Review of Systems   Constitutional: Negative.  Negative for fever.   HENT: Negative.  Negative for congestion.    Eyes: Negative.    Respiratory: Negative.  Negative for cough and shortness of breath.    Cardiovascular: Negative.  Negative for chest pain.   Gastrointestinal: Negative.  Negative for abdominal pain, diarrhea, nausea and vomiting.   Genitourinary: Negative.    Musculoskeletal: Negative.    Skin: Negative.    Neurological: Negative.    All other systems reviewed and are negative.      Past Medical History:   Diagnosis Date   • Acid reflux    • Arthritis    • Hydrocele     SCHEDULED FOR SURGERY   • Intermittent palpitations    • Joint pain     MULTIPLE JOINTS       No Known Allergies    Past Surgical History:   Procedure Laterality Date   • HERNIA REPAIR      BILATERAL   • HYDROCELECTOMY Right 6/1/2016    Procedure: HYDROCELECTOMY;  Surgeon: Jorge Browning MD;  Location: [facility];  Service:        No family history on file.    Social History     Socioeconomic History   • Marital status: Single     Spouse name: Not on file   • Number of children: Not on file   • Years of education: Not on file   • Highest education level: Not on file   Tobacco Use   • Smoking status: Former Smoker     Packs/day: 1.00     Years: 50.00     Pack years: 50.00   • Smokeless tobacco: Never Used   • Tobacco comment: quit 2004   Vaping Use   • Vaping Use: Never used   Substance and Sexual Activity   • Alcohol use: No   • Drug use: No   • Sexual activity: Defer           Objective   Physical Exam  Vitals and nursing note reviewed.   Constitutional:       General: He is not in acute distress.     Appearance:  Normal appearance. He is well-developed. He is not diaphoretic.   HENT:      Head: Atraumatic.      Right Ear: External ear normal.      Left Ear: External ear normal.      Nose: Nose normal.      Mouth/Throat:      Mouth: Mucous membranes are moist.      Pharynx: Oropharynx is clear.   Eyes:      General: No scleral icterus.     Extraocular Movements: Extraocular movements intact.      Conjunctiva/sclera: Conjunctivae normal.      Pupils: Pupils are equal, round, and reactive to light.   Neck:      Thyroid: No thyromegaly.      Vascular: No JVD.   Cardiovascular:      Rate and Rhythm: Normal rate and regular rhythm.      Pulses: Normal pulses.      Heart sounds: Normal heart sounds. No murmur heard.     Pulmonary:      Effort: Pulmonary effort is normal. No respiratory distress.      Breath sounds: Normal breath sounds. No wheezing or rales.   Chest:      Chest wall: No tenderness.   Abdominal:      General: Bowel sounds are normal. There is no distension.      Palpations: Abdomen is soft. There is no mass.      Tenderness: There is no abdominal tenderness. There is no guarding or rebound.   Musculoskeletal:         General: Normal range of motion.      Cervical back: Normal range of motion and neck supple.   Lymphadenopathy:      Cervical: No cervical adenopathy.   Skin:     General: Skin is warm and dry.      Capillary Refill: Capillary refill takes less than 2 seconds.      Coloration: Skin is not pale.      Findings: No erythema or rash.   Neurological:      General: No focal deficit present.      Mental Status: He is alert and oriented to person, place, and time.      Cranial Nerves: No cranial nerve deficit.      Coordination: Coordination normal.      Deep Tendon Reflexes: Reflexes are normal and symmetric.   Psychiatric:         Mood and Affect: Mood normal.         Behavior: Behavior normal.         Thought Content: Thought content normal.         Judgment: Judgment normal.         Procedures           ED  Course                                           MDM  Number of Diagnoses or Management Options  Medication refill: new and does not require workup     Amount and/or Complexity of Data Reviewed  Discuss the patient with other providers: yes    Risk of Complications, Morbidity, and/or Mortality  Presenting problems: low  Diagnostic procedures: low  Management options: low    Patient Progress  Patient progress: improved      Final diagnoses:   Medication refill       ED Disposition  ED Disposition     ED Disposition Condition Comment    Discharge Good           No follow-up provider specified.       Medication List      New Prescriptions    pantoprazole 40 MG EC tablet  Commonly known as: PROTONIX  Take 1 tablet by mouth Daily.           Where to Get Your Medications      These medications were sent to [facility]  [address] [place] [address] [place] [address]    Hours: 7AM-5PM Mon-Fri Phone: [phone]   · metoprolol tartrate 25 MG tablet  · pantoprazole 40 MG EC tablet          Diya Raya, APRN  05/24/21 1951

## 2021-11-15 ENCOUNTER — TELEPHONE (OUTPATIENT)
Dept: UROLOGY | Facility: CLINIC | Age: 76
End: 2021-11-15

## 2021-12-01 ENCOUNTER — OFFICE VISIT (OUTPATIENT)
Dept: UROLOGY | Facility: CLINIC | Age: 76
End: 2021-12-01

## 2021-12-01 VITALS
SYSTOLIC BLOOD PRESSURE: 112 MMHG | HEIGHT: 73 IN | BODY MASS INDEX: 18.69 KG/M2 | WEIGHT: 141 LBS | DIASTOLIC BLOOD PRESSURE: 68 MMHG

## 2021-12-01 DIAGNOSIS — R97.20 ELEVATED PROSTATE SPECIFIC ANTIGEN (PSA): ICD-10-CM

## 2021-12-01 DIAGNOSIS — R33.9 INCOMPLETE BLADDER EMPTYING: ICD-10-CM

## 2021-12-01 DIAGNOSIS — N42.9 DISORDER OF PROSTATE: Primary | ICD-10-CM

## 2021-12-01 PROCEDURE — 36415 COLL VENOUS BLD VENIPUNCTURE: CPT | Performed by: UROLOGY

## 2021-12-01 PROCEDURE — 99203 OFFICE O/P NEW LOW 30 MIN: CPT | Performed by: UROLOGY

## 2021-12-01 PROCEDURE — 84153 ASSAY OF PSA TOTAL: CPT | Performed by: UROLOGY

## 2021-12-01 PROCEDURE — 51798 US URINE CAPACITY MEASURE: CPT | Performed by: UROLOGY

## 2021-12-01 RX ORDER — TAMSULOSIN HYDROCHLORIDE 0.4 MG/1
1 CAPSULE ORAL DAILY
Qty: 90 CAPSULE | Refills: 3 | Status: SHIPPED | OUTPATIENT
Start: 2021-12-01 | End: 2022-11-26

## 2021-12-01 NOTE — PROGRESS NOTES
Office Visit New Urology      Patient Name: Edmond Zaman  : 1945   MRN: [id number]     Chief Complaint:    Chief Complaint   Patient presents with   • Benign Prostatic Hypertrophy     New Patient        Referring Provider: Areli Cole APRN    History of Present Illness: Edmond Zaman is a 76 y.o. male who presents to Urology today for possible prostate cancer.  He had an MRI of the pelvis performed due to lower extremity pain.  On the MRI result the radiologist mentioned hyperdense lesions and possibility of prostate cancer.  This was not a multiparametric MRI.    He reports some hesitancy with urination.  He wakes up once/night usually and occasionally twice.  He usually has a gatorade or water before bed.  He reports that his stream is weaker than it used to be.  No dysuria or gross hematuria.  No family history of prostate cancer.  He reports he has never had a PSA performed.  He reports he has never had a prostate exam.   He has never been on an alpha blocker or taken any medication for his prostate.  He denies perineal pain or discomfort.   He is sexual active and has no issues with erections.        Subjective      Review of System: Review of Systems   Constitutional: Negative for chills, fatigue, fever and unexpected weight change.   HENT: Negative for congestion, rhinorrhea and sore throat.    Eyes: Negative for visual disturbance.   Respiratory: Negative for apnea, cough, chest tightness and shortness of breath.    Cardiovascular: Negative for chest pain.   Gastrointestinal: Negative for abdominal pain, constipation, diarrhea, nausea and vomiting.   Endocrine: Negative for polydipsia and polyuria.   Genitourinary: Negative for difficulty urinating, dysuria, enuresis, flank pain, frequency, genital sores, hematuria and urgency.   Musculoskeletal: Positive for back pain. Negative for gait problem.   Skin: Negative for rash and wound.   Allergic/Immunologic: Negative for  immunocompromised state.   Neurological: Negative for dizziness, tremors, syncope, weakness and light-headedness.   Hematological: Does not bruise/bleed easily.      I have reviewed the ROS documented by my clinical staff, I have updated appropriately and I agree. Manuela Eddy MD    Past Medical History:   Past Medical History:   Diagnosis Date   • Acid reflux    • Arthritis    • Hydrocele     SCHEDULED FOR SURGERY   • Intermittent palpitations    • Joint pain     MULTIPLE JOINTS       Past Surgical History:   Past Surgical History:   Procedure Laterality Date   • HERNIA REPAIR      BILATERAL   • HYDROCELECTOMY Right 6/1/2016    Procedure: HYDROCELECTOMY;  Surgeon: Jorge Browning MD;  Location: [facility];  Service:        Family History:   Family History   Problem Relation Age of Onset   • No Known Problems Father    • No Known Problems Mother        Social History:   Social History     Socioeconomic History   • Marital status: Single   Tobacco Use   • Smoking status: Former Smoker     Packs/day: 1.00     Years: 50.00     Pack years: 50.00   • Smokeless tobacco: Never Used   • Tobacco comment: quit 2004   Vaping Use   • Vaping Use: Never used   Substance and Sexual Activity   • Alcohol use: No   • Drug use: No   • Sexual activity: Defer       Medications:     Current Outpatient Medications:   •  gabapentin (NEURONTIN) 300 MG capsule, Take 1 capsule by mouth 3 (Three) Times a Day., Disp: 51 capsule, Rfl: 0  •  pantoprazole (PROTONIX) 40 MG EC tablet, Take 1 tablet by mouth Daily., Disp: 30 tablet, Rfl: 0  •  metoprolol tartrate (LOPRESSOR) 25 MG tablet, Take 0.5 tablet by mouth 2 (Two) Times a Day, Disp: 30 tablet, Rfl: 0  •  tamsulosin (FLOMAX) 0.4 MG capsule 24 hr capsule, Take 1 capsule by mouth Daily, Disp: 90 capsule, Rfl: 3    Allergies:   No Known Allergies    IPSS Questionnaire (AUA-7):  Over the past month…    1)  Incomplete Emptying  How often have you had a sensation of not emptying your bladder?  " 2 - Less than half the time   2)  Frequency  How often have you had to urinate less than every two hours? 2 - Less than half the time   3)  Intermittency  How often have you found you stopped and started again several times when you urinated?  4 - More than half the time   4) Urgency  How often have you found it difficult to postpone urination?  1 - Less than 1 time in 5   5) Weak Stream  How often have you had a weak urinary stream?  2 - Less than half the time   6) Straining  How often have you had to push or strain to begin urination?  1 - Less than 1 time in 5   7) Nocturia  How many times did you typically get up at night to urinate?  1 - 1 time   Total Score:  13   The International Prostate Symptom Score (IPSS) is used to screen, diagnose, track symptoms of benign prostatic hyperplasia (BPH).    0-7 pts (Mild Symptoms)  / 8-19 pts (Moderate) / 20-35 (Severe)    Quality of life due to urinary symptoms:  If you were to spend the rest of your life with your urinary condition the way it is now, how would you feel about that? 3-Mixed   Urine Leakage (Incontinence) 1-Mild (A few drops a day, no pad use)       Post void residual bladder scan:   0 mL     Objective     Physical Exam:   Vital Signs:   Vitals:    12/01/21 1410   BP: 112/68   Weight: 64 kg (141 lb)   Height: 185.4 cm (73\")     Body mass index is 18.6 kg/m².     Physical Exam  Vitals and nursing note reviewed.   Constitutional:       General: He is awake. He is not in acute distress.     Appearance: Normal appearance. He is well-developed. He is obese.   HENT:      Head: Normocephalic and atraumatic.      Right Ear: External ear normal.      Left Ear: External ear normal.      Nose: Nose normal.   Eyes:      Conjunctiva/sclera: Conjunctivae normal.   Pulmonary:      Effort: Pulmonary effort is normal.   Abdominal:      General: There is no distension.      Palpations: Abdomen is soft. There is no mass.      Tenderness: There is no abdominal tenderness. " There is no right CVA tenderness, left CVA tenderness, guarding or rebound.      Hernia: No hernia is present. There is no hernia in the left inguinal area or right inguinal area.   Genitourinary:     Pubic Area: No rash.       Penis: Normal.       Testes: Normal.      Prostate: Normal.      Rectum: Normal. No mass or tenderness. Normal anal tone.      Comments: Approx 45 g prostate.  No nodules.    Musculoskeletal:      Cervical back: Normal range of motion.   Lymphadenopathy:      Lower Body: No right inguinal adenopathy. No left inguinal adenopathy.   Skin:     General: Skin is warm.   Neurological:      General: No focal deficit present.      Mental Status: He is alert and oriented to person, place, and time.   Psychiatric:         Behavior: Behavior normal. Behavior is cooperative.         Labs:   Brief Urine Lab Results  (Last result in the past 365 days)      Color   Clarity   Blood   Leuk Est   Nitrite   Protein   CREAT   Urine HCG        05/07/21 1433 Dark Yellow   Clear   Negative   Negative   Negative   30 mg/dL (1+)                      Lab Results   Component Value Date    GLUCOSE 108 (H) 05/08/2021    CALCIUM 7.9 (L) 05/08/2021     05/08/2021    K 3.5 05/08/2021    CO2 19.0 (L) 05/08/2021     (H) 05/08/2021    BUN 17 05/08/2021    CREATININE 0.72 (L) 05/08/2021    EGFRIFNONA 106 05/08/2021    BCR 23.6 05/08/2021    ANIONGAP 7.0 05/08/2021       Lab Results   Component Value Date    WBC 7.03 05/08/2021    HGB 10.6 (L) 05/08/2021    HCT 33.2 (L) 05/08/2021    MCV 88.3 05/08/2021     05/08/2021       No results found for: PSA     Images:   No Images in the past 120 days found..    Measures:   Tobacco:   Edmond Zaman  reports that he has quit smoking. He has a 50.00 pack-year smoking history. He has never used smokeless tobacco..    Urine Incontinence: Patient reports that he is not currently experiencing any symptoms of urinary incontinence.       Assessment / Plan       Assessment/Plan:   Edmond Zaman is a 76 y.o. male who presented today for MRI finding of possible prostate lesions.  He has never had prostate cancer screening.  He also reports low urinary tract symptoms.  I will go ahead and start him on tamsulosin.  We discussed his possible treatment options for bladder outlet obstruction, however, he reports overall he feels very well and is not interested in surgery at this time.  As far as his MRI lesions are concerned, we will obtain a PSA today.  His HERMELINDA was normal.  He has no family history of prostate cancer.  I will have him follow-up in 3 months for symptom check.  If his PSA is elevated I will call him in sooner to discuss.    Diagnoses and all orders for this visit:    1. Disorder of prostate (Primary)  -     Cancel: PSA DIAGNOSTIC  -     tamsulosin (FLOMAX) 0.4 MG capsule 24 hr capsule; Take 1 capsule by mouth Daily  Dispense: 90 capsule; Refill: 3  -     PSA DIAGNOSTIC    2. Incomplete bladder emptying  -     Bladder Scan        Addendum: Pt PSA came back over 10.  I discussed this finding with him and we will move forward with a prostate biopsy.  I discussed the risks and benefits and he understood.  Rx for Levaquin and fleets enema sent to pharmacy.     Follow Up:   Return in about 3 months (around 3/1/2022) for Recheck.    I spent approximately 30 minutes providing clinical care for this patient; including review of patient's chart and provider documentation, face to face time spent with patient in examination room (obtaining history, performing physical exam, discussing diagnosis and management options), placing orders, and completing patient documentation.     Manuela Eddy MD  [facility] [place]

## 2021-12-02 LAB — PSA SERPL-MCNC: 10.9 NG/ML (ref 0–4)

## 2021-12-03 RX ORDER — LEVOFLOXACIN 500 MG/1
500 TABLET, FILM COATED ORAL DAILY
Qty: 3 TABLET | Refills: 0 | Status: SHIPPED | OUTPATIENT
Start: 2021-12-03 | End: 2021-12-06

## 2021-12-03 RX ORDER — BISACODYL 10 MG/30ML
10 ENEMA RECTAL ONCE
Qty: 37 ML | Refills: 0 | Status: SHIPPED | OUTPATIENT
Start: 2021-12-03 | End: 2021-12-03

## 2021-12-08 DIAGNOSIS — R97.20 ELEVATED PROSTATE SPECIFIC ANTIGEN (PSA): Primary | ICD-10-CM

## 2021-12-08 RX ORDER — BISACODYL 10 MG/30ML
10 ENEMA RECTAL ONCE
Qty: 30 ML | Refills: 0 | Status: SHIPPED | OUTPATIENT
Start: 2021-12-08 | End: 2021-12-08

## 2021-12-08 RX ORDER — LEVOFLOXACIN 500 MG/1
500 TABLET, FILM COATED ORAL DAILY
Qty: 3 TABLET | Refills: 0 | Status: SHIPPED | OUTPATIENT
Start: 2021-12-08 | End: 2021-12-11

## 2021-12-22 ENCOUNTER — PROCEDURE VISIT (OUTPATIENT)
Dept: UROLOGY | Facility: CLINIC | Age: 76
End: 2021-12-22

## 2021-12-22 VITALS — WEIGHT: 141 LBS | HEIGHT: 73 IN | BODY MASS INDEX: 18.69 KG/M2

## 2021-12-22 DIAGNOSIS — Z48.816 AFTERCARE FOLLOWING SURGERY OF THE GENITOURINARY SYSTEM: Primary | ICD-10-CM

## 2021-12-22 DIAGNOSIS — R97.20 ELEVATED PROSTATE SPECIFIC ANTIGEN (PSA): ICD-10-CM

## 2021-12-22 PROCEDURE — 55700 PR PROSTATE NEEDLE BIOPSY ANY APPROACH: CPT | Performed by: UROLOGY

## 2021-12-22 PROCEDURE — 96372 THER/PROPH/DIAG INJ SC/IM: CPT | Performed by: UROLOGY

## 2021-12-22 PROCEDURE — 76942 ECHO GUIDE FOR BIOPSY: CPT | Performed by: UROLOGY

## 2021-12-22 RX ORDER — GENTAMICIN SULFATE 40 MG/ML
80 INJECTION, SOLUTION INTRAMUSCULAR; INTRAVENOUS ONCE
Status: COMPLETED | OUTPATIENT
Start: 2021-12-22 | End: 2021-12-22

## 2021-12-22 RX ADMIN — GENTAMICIN SULFATE 80 MG: 40 INJECTION, SOLUTION INTRAMUSCULAR; INTRAVENOUS at 14:37

## 2021-12-22 NOTE — PROGRESS NOTES
Elevated PSA Office Visit      Patient Name: Edmond Zaman  : 1945   MRN: [id number]     Chief Complaint:  Elevated PSA.    Chief Complaint   Patient presents with   • Prostate disorder     Prostate Bx       Referring Provider: No ref. provider found    History of Present Illness: Edmond Zaman is a 76 y.o. male who presents today with history of elevated PSA.  He is here today for prostate biopsy.  He is here today for prostate biopsy.      Lab Results   Component Value Date    PSA 10.900 (H) 2021        Prostate Biopsy Collaborative Group (PBCG) Risk Calculator:   57% risk high grade cancer  15% low grade cancer  28% negative biopsy     Procedure: Transrectal ultrasound with biopsy of prostate, US Guidance     Indication(s) for the procedure include(s): elevated PSA.     Pre-procedure(s): antibiotic(s) was given prior to procedure.     The procedure's risks and benefits were discussed with the patient. Informed consent was obtained prior to the procedure.   We discussed possible complications and risks, including bleeding (urine, bowel, ejaculate), infection, urinary retention, pain and sampling error.     Prior to the start of the procedure a time out was taken and the identity of the patient was confirmed via name and date of birth with the patient. The positioning of the patient was verified. The availability of the correct equipment was verified.     The patient was placed in the left lateral decubitus position. The 7.0 mHz biplanar transrectal ultrasound probe was placed per rectum. Imaging in transverse and longitudinal views revealed the findings below.  Prior to biopsy, local anesthesia (1% Xylocaine) was injected into the prostatic capsule.  Biopsies were performed with the biopsy device as noted below.    Findings:   Digital rectal exam:  No nodules.    Prostate Volume: 33.1 ml.  Seminal Vesicles: normal   12-core biopsy template performed; Biopsies were taken from the base,  midline apex, and other areas as indicated bilaterally.   The patient tolerated the procedure well.     Complications: none.     Patient Instructions: Call for temp > 101, inability to urinate, chills, continuous urinary/bowel bleeding and intolerable pain.      Subjective      Review of System: Review of Systems   Constitutional: Negative for chills, fatigue, fever and unexpected weight change.   HENT: Negative for congestion, rhinorrhea and sore throat.    Eyes: Negative for visual disturbance.   Respiratory: Negative for apnea, cough, chest tightness and shortness of breath.    Cardiovascular: Negative for chest pain.   Gastrointestinal: Negative for abdominal pain, constipation, diarrhea, nausea and vomiting.   Endocrine: Negative for polydipsia and polyuria.   Genitourinary: Negative for difficulty urinating, dysuria, enuresis, flank pain, frequency, genital sores, hematuria and urgency.   Musculoskeletal: Negative for gait problem.   Skin: Negative for rash and wound.   Allergic/Immunologic: Negative for immunocompromised state.   Neurological: Negative for dizziness, tremors, syncope, weakness and light-headedness.   Hematological: Does not bruise/bleed easily.      I have reviewed the ROS documented by my clinical staff, I have updated appropriately and I agree. Manuela Eddy MD    Past Medical History:   Past Medical History:   Diagnosis Date   • Acid reflux    • Arthritis    • Hydrocele     SCHEDULED FOR SURGERY   • Intermittent palpitations    • Joint pain     MULTIPLE JOINTS       Past Surgical History:   Past Surgical History:   Procedure Laterality Date   • HERNIA REPAIR      BILATERAL   • HYDROCELECTOMY Right 6/1/2016    Procedure: HYDROCELECTOMY;  Surgeon: Jorge Browning MD;  Location: [facility];  Service:        Family History:   Family History   Problem Relation Age of Onset   • No Known Problems Father    • No Known Problems Mother        Social History:   Social History     Socioeconomic  "History   • Marital status: Single   Tobacco Use   • Smoking status: Former Smoker     Packs/day: 1.00     Years: 50.00     Pack years: 50.00   • Smokeless tobacco: Never Used   • Tobacco comment: quit 2004   Vaping Use   • Vaping Use: Never used   Substance and Sexual Activity   • Alcohol use: No   • Drug use: No   • Sexual activity: Defer       Medications:     Current Outpatient Medications:   •  gabapentin (NEURONTIN) 300 MG capsule, Take 1 capsule by mouth 3 (Three) Times a Day., Disp: 51 capsule, Rfl: 0  •  metoprolol tartrate (LOPRESSOR) 25 MG tablet, Take 0.5 tablet by mouth 2 (Two) Times a Day, Disp: 30 tablet, Rfl: 0  •  pantoprazole (PROTONIX) 40 MG EC tablet, Take 1 tablet by mouth Daily., Disp: 30 tablet, Rfl: 0  •  tamsulosin (FLOMAX) 0.4 MG capsule 24 hr capsule, Take 1 capsule by mouth Daily, Disp: 90 capsule, Rfl: 3    Allergies:   No Known Allergies      Objective     Physical Exam:   Vital Signs:   Vitals:    12/22/21 1306   Weight: 64 kg (141 lb)   Height: 185.4 cm (73\")     Body mass index is 18.6 kg/m².     Physical Exam  Vitals and nursing note reviewed.   Constitutional:       General: He is awake. He is not in acute distress.     Appearance: Normal appearance. He is well-developed.   HENT:      Head: Normocephalic and atraumatic.      Right Ear: External ear normal.      Left Ear: External ear normal.      Nose: Nose normal.   Eyes:      Conjunctiva/sclera: Conjunctivae normal.   Pulmonary:      Effort: Pulmonary effort is normal.   Abdominal:      General: There is no distension.      Palpations: Abdomen is soft. There is no mass.      Tenderness: There is no abdominal tenderness. There is no right CVA tenderness, left CVA tenderness, guarding or rebound.      Hernia: No hernia is present. There is no hernia in the left inguinal area or right inguinal area.   Genitourinary:     Pubic Area: No rash.       Penis: Normal.       Testes: Normal.      Prostate: Normal.      Rectum: Normal. No " mass or tenderness. Normal anal tone.      Comments: Approx 40 g prostate.  No nodules.    Musculoskeletal:      Cervical back: Normal range of motion.   Lymphadenopathy:      Lower Body: No right inguinal adenopathy. No left inguinal adenopathy.   Skin:     General: Skin is warm.   Neurological:      General: No focal deficit present.      Mental Status: He is alert and oriented to person, place, and time.   Psychiatric:         Behavior: Behavior normal. Behavior is cooperative.         Labs:   Hematocrit (%)   Date Value   05/08/2021 33.2 (L)   05/07/2021 37.2 (L)       Lab Results   Component Value Date    PSA 10.900 (H) 12/01/2021       Images:   No Images in the past 120 days found..    Measures:   Tobacco:   Edmond Zmaan  reports that he has quit smoking. He has a 50.00 pack-year smoking history. He has never used smokeless tobacco..    Urine Incontinence: Patient reports that he is not currently experiencing any symptoms of urinary incontinence.         Assessment / Plan      Assessment/Plan:   Mr. Zaman is a 76 y.o. male with elevated PSA.  He underwent his prostate biopsy today without any immediate complications.  He took his Levaquin yesterday, 1 this morning, and was instructed to take his last one tomorrow.  I discussed post procedure instructions with him.  He did not have any questions.  I will have him follow-up in 1 week for path discussion.    Diagnoses and all orders for this visit:    1. Elevated prostate specific antigen (PSA)                 Follow Up:   Return in about 1 week (around 12/29/2021).    I spent approximately 10 minutes providing clinical care for this patient; including review of patient's chart and provider documentation, face to face time spent with patient in examination room (obtaining history, performing physical exam, discussing diagnosis and management options), placing orders, and completing patient documentation.     Manuela Eddy MD  [facility] [place]

## 2021-12-29 ENCOUNTER — OFFICE VISIT (OUTPATIENT)
Dept: UROLOGY | Facility: CLINIC | Age: 76
End: 2021-12-29

## 2021-12-29 VITALS — WEIGHT: 141 LBS | HEIGHT: 73 IN | BODY MASS INDEX: 18.69 KG/M2

## 2021-12-29 DIAGNOSIS — R97.20 ELEVATED PROSTATE SPECIFIC ANTIGEN (PSA): Primary | ICD-10-CM

## 2021-12-29 PROCEDURE — 99213 OFFICE O/P EST LOW 20 MIN: CPT | Performed by: UROLOGY

## 2021-12-29 NOTE — PROGRESS NOTES
Follow Up Office Visit      Patient Name: Edmond Zaman  : 1945   MRN: [id number]     Chief Complaint:    Chief Complaint   Patient presents with   • Elevated PSA     Prostate Bx Results       Referring Provider: No ref. provider found    History of Present Illness: Edmond Zaman is a 76 y.o. male who presents today for follow up after prostate biopsy.  His biopsy was benign. He is not having any issues.  NO LUTS.  Sexually active with no ED.      Subjective      Review of System: Review of Systems   Constitutional: Negative for chills, fatigue, fever and unexpected weight change.   HENT: Negative for congestion, rhinorrhea and sore throat.    Eyes: Negative for visual disturbance.   Respiratory: Negative for apnea, cough, chest tightness and shortness of breath.    Cardiovascular: Negative for chest pain.   Gastrointestinal: Negative for abdominal pain, constipation, diarrhea, nausea and vomiting.   Endocrine: Negative for polydipsia and polyuria.   Genitourinary: Negative for difficulty urinating, dysuria, enuresis, flank pain, frequency, genital sores, hematuria and urgency.   Musculoskeletal: Negative for gait problem.   Skin: Negative for rash and wound.   Allergic/Immunologic: Negative for immunocompromised state.   Neurological: Negative for dizziness, tremors, syncope, weakness and light-headedness.   Hematological: Does not bruise/bleed easily.      I have reviewed the ROS documented by my clinical staff, I have updated appropriately and I agree. Manuela Eddy MD    I have reviewed and the following portions of the patient's history were updated as appropriate: past family history, past medical history, past social history, past surgical history and problem list.    Past Medical History:   Past Medical History:   Diagnosis Date   • Acid reflux    • Arthritis    • Hydrocele     SCHEDULED FOR SURGERY   • Intermittent palpitations    • Joint pain     MULTIPLE JOINTS       Past Surgical  "History:  Past Surgical History:   Procedure Laterality Date   • HERNIA REPAIR      BILATERAL   • HYDROCELECTOMY Right 6/1/2016    Procedure: HYDROCELECTOMY;  Surgeon: Jorge Browning MD;  Location: [facility];  Service:        Family History:   family history includes No Known Problems in his father and mother.   Otherwise pertinent FHx was reviewed and not pertinent to current issue.    Social History:    reports that he has quit smoking. He has a 50.00 pack-year smoking history. He has never used smokeless tobacco. He reports that he does not drink alcohol and does not use drugs.    Medications:     Current Outpatient Medications:   •  gabapentin (NEURONTIN) 300 MG capsule, Take 1 capsule by mouth 3 (Three) Times a Day., Disp: 51 capsule, Rfl: 0  •  pantoprazole (PROTONIX) 40 MG EC tablet, Take 1 tablet by mouth Daily., Disp: 30 tablet, Rfl: 0  •  tamsulosin (FLOMAX) 0.4 MG capsule 24 hr capsule, Take 1 capsule by mouth Daily, Disp: 90 capsule, Rfl: 3  •  metoprolol tartrate (LOPRESSOR) 25 MG tablet, Take 0.5 tablet by mouth 2 (Two) Times a Day, Disp: 30 tablet, Rfl: 0    Allergies:   No Known Allergies        Objective     Physical Exam:   Vital Signs:   Vitals:    12/29/21 1405   Weight: 64 kg (141 lb)   Height: 185.4 cm (73\")     Body mass index is 18.6 kg/m².     Physical Exam  Vitals and nursing note reviewed.   Constitutional:       General: He is awake. He is not in acute distress.     Appearance: Normal appearance. He is well-developed. He is obese.   HENT:      Head: Normocephalic and atraumatic.      Right Ear: External ear normal.      Left Ear: External ear normal.      Nose: Nose normal.   Eyes:      Conjunctiva/sclera: Conjunctivae normal.   Pulmonary:      Effort: Pulmonary effort is normal.   Abdominal:      General: There is no distension.      Palpations: Abdomen is soft. There is no mass.      Tenderness: There is no abdominal tenderness. There is no right CVA tenderness, left CVA " tenderness, guarding or rebound.      Hernia: No hernia is present. There is no hernia in the left inguinal area or right inguinal area.   Genitourinary:     Pubic Area: No rash.       Rectum: No mass or tenderness. Normal anal tone.   Musculoskeletal:      Cervical back: Normal range of motion.   Lymphadenopathy:      Lower Body: No right inguinal adenopathy. No left inguinal adenopathy.   Skin:     General: Skin is warm.   Neurological:      General: No focal deficit present.      Mental Status: He is alert and oriented to person, place, and time.   Psychiatric:         Behavior: Behavior normal. Behavior is cooperative.         Labs:   Brief Urine Lab Results  (Last result in the past 365 days)      Color   Clarity   Blood   Leuk Est   Nitrite   Protein   CREAT   Urine HCG        05/07/21 1433 Dark Yellow   Clear   Negative   Negative   Negative   30 mg/dL (1+)                      Lab Results   Component Value Date    GLUCOSE 108 (H) 05/08/2021    CALCIUM 7.9 (L) 05/08/2021     05/08/2021    K 3.5 05/08/2021    CO2 19.0 (L) 05/08/2021     (H) 05/08/2021    BUN 17 05/08/2021    CREATININE 0.72 (L) 05/08/2021    EGFRIFNONA 106 05/08/2021    BCR 23.6 05/08/2021    ANIONGAP 7.0 05/08/2021       Lab Results   Component Value Date    WBC 7.03 05/08/2021    HGB 10.6 (L) 05/08/2021    HCT 33.2 (L) 05/08/2021    MCV 88.3 05/08/2021     05/08/2021       Lab Results   Component Value Date    PSA 10.900 (H) 12/01/2021       Images:   No Images in the past 120 days found..    Pathology:  Benign prostatic tissue with chronic inflammation.      Measures:   Tobacco:   Edmond Zaman  reports that he has quit smoking. He has a 50.00 pack-year smoking history. He has never used smokeless tobacco.    Urine Incontinence: Patient reports that he is not currently experiencing any symptoms of urinary incontinence.        Assessment / Plan      Assessment/Plan:   76 y.o. male who presented today for follow up of  his prostate biopsy.  His biopsy returned benign.  I discussed that moving forward he likely can forego any further PSA testing for prostate cancer screening.  I discussed that the risk of a clinically significant prostate cancer at his age after a negative biopsy is quite small.  He states he would like to test his PSA at least 1 more time.  I will have him follow-up in a year.    Diagnoses and all orders for this visit:    1. Elevated prostate specific antigen (PSA) (Primary)           Follow Up:   Return in about 1 year (around 12/29/2022).    I spent approximately 25 minutes providing clinical care for this patient; including review of patient's chart and provider documentation, face to face time spent with patient in examination room (obtaining history, performing physical exam, discussing diagnosis and management options), placing orders, and completing patient documentation.     Manuela Eddy MD  [facility] [place]